# Patient Record
Sex: FEMALE | Race: BLACK OR AFRICAN AMERICAN | Employment: OTHER | URBAN - METROPOLITAN AREA
[De-identification: names, ages, dates, MRNs, and addresses within clinical notes are randomized per-mention and may not be internally consistent; named-entity substitution may affect disease eponyms.]

---

## 2018-04-29 ENCOUNTER — ED HISTORICAL/CONVERTED ENCOUNTER (OUTPATIENT)
Dept: OTHER | Age: 68
End: 2018-04-29

## 2020-10-13 ENCOUNTER — HOSPITAL ENCOUNTER (EMERGENCY)
Age: 70
Discharge: HOME | End: 2020-10-13
Attending: EMERGENCY MEDICINE
Payer: MEDICARE

## 2020-10-13 ENCOUNTER — APPOINTMENT (OUTPATIENT)
Dept: GENERAL RADIOLOGY | Age: 70
End: 2020-10-13
Attending: EMERGENCY MEDICINE
Payer: MEDICARE

## 2020-10-13 VITALS
HEIGHT: 61 IN | HEART RATE: 71 BPM | WEIGHT: 149 LBS | TEMPERATURE: 98.5 F | DIASTOLIC BLOOD PRESSURE: 77 MMHG | OXYGEN SATURATION: 97 % | BODY MASS INDEX: 28.13 KG/M2 | SYSTOLIC BLOOD PRESSURE: 210 MMHG | RESPIRATION RATE: 18 BRPM

## 2020-10-13 DIAGNOSIS — J20.9 ACUTE BRONCHITIS, UNSPECIFIED ORGANISM: ICD-10-CM

## 2020-10-13 DIAGNOSIS — E87.70 HYPERVOLEMIA, UNSPECIFIED HYPERVOLEMIA TYPE: ICD-10-CM

## 2020-10-13 DIAGNOSIS — I16.0 HYPERTENSIVE URGENCY: Primary | ICD-10-CM

## 2020-10-13 DIAGNOSIS — N18.6 ESRD ON DIALYSIS (HCC): ICD-10-CM

## 2020-10-13 DIAGNOSIS — Z99.2 ESRD ON DIALYSIS (HCC): ICD-10-CM

## 2020-10-13 PROCEDURE — 99282 EMERGENCY DEPT VISIT SF MDM: CPT

## 2020-10-13 PROCEDURE — 93005 ELECTROCARDIOGRAM TRACING: CPT

## 2020-10-13 RX ORDER — CLONIDINE HYDROCHLORIDE 0.1 MG/1
0.1 TABLET ORAL
Status: DISCONTINUED | OUTPATIENT
Start: 2020-10-13 | End: 2020-10-13 | Stop reason: HOSPADM

## 2020-10-13 RX ORDER — AZITHROMYCIN 250 MG/1
TABLET, FILM COATED ORAL
Qty: 6 TAB | Refills: 0 | Status: SHIPPED | OUTPATIENT
Start: 2020-10-13 | End: 2022-02-04 | Stop reason: SDUPTHER

## 2020-10-13 RX ORDER — ALBUTEROL SULFATE 90 UG/1
2 AEROSOL, METERED RESPIRATORY (INHALATION)
Qty: 1 INHALER | Refills: 0 | Status: SHIPPED | OUTPATIENT
Start: 2020-10-13

## 2020-10-13 RX ORDER — ASPIRIN 325 MG
325 TABLET ORAL
Status: DISCONTINUED | OUTPATIENT
Start: 2020-10-13 | End: 2020-10-13 | Stop reason: HOSPADM

## 2020-10-13 RX ORDER — DEXTROMETHORPHAN HYDROBROMIDE, GUAIFENESIN 20; 400 MG/20ML; MG/20ML
5 SOLUTION ORAL EVERY 6 HOURS
Qty: 200 ML | Refills: 0 | Status: SHIPPED | OUTPATIENT
Start: 2020-10-13

## 2020-10-13 NOTE — ED PROVIDER NOTES
EMERGENCY DEPARTMENT HISTORY AND PHYSICAL EXAM      Date: (Not on file)  Patient Name: Stacey Richardson    History of Presenting Illness     Chief Complaint   Patient presents with    Chest Pain    Cough       History Provided By: Patient    HPI: Stacey Richardson, 72 y.o. female with a past medical history significant renal insufficiency presents to the ED with chief complaint of Chest Pain and Cough  . History of renal insufficiency on dialysis. Patient missed dialysis yesterday. Over the course of the last week she is had a lot of cough congestion. No shortness of breath. Chest pain only when she coughs. No wheezing. No nausea or vomiting. No fevers. There are no other complaints, changes, or physical findings at this time. PCP: No primary care provider on file. Current Facility-Administered Medications   Medication Dose Route Frequency Provider Last Rate Last Dose    cloNIDine HCL (CATAPRES) tablet 0.1 mg  0.1 mg Oral NOW Kayy Carpio MD        aspirin tablet 325 mg  325 mg Oral NOW Kayy Carpio MD           Past History     Past Medical History:  No past medical history on file. Past Surgical History:  No past surgical history on file. Family History:  No family history on file. Social History:  Social History     Tobacco Use    Smoking status: Not on file   Substance Use Topics    Alcohol use: Not on file    Drug use: Not on file       Allergies: Allergies   Allergen Reactions    Percocet [Oxycodone-Acetaminophen] Itching         Review of Systems   Review of Systems   Constitutional: Positive for chills and fatigue. Negative for diaphoresis. HENT: Positive for congestion and sore throat. Negative for ear pain and nosebleeds. Eyes: Negative. Negative for pain, discharge and redness. Respiratory: Positive for cough and shortness of breath. Negative for chest tightness and wheezing. Cardiovascular: Negative.   Negative for chest pain, palpitations and leg swelling. Gastrointestinal: Negative. Negative for abdominal pain, constipation, diarrhea, nausea and vomiting. Endocrine: Negative. Negative for cold intolerance. Genitourinary: Negative. Negative for difficulty urinating, dysuria and hematuria. Musculoskeletal: Negative. Negative for arthralgias, joint swelling and neck pain. Skin: Negative. Negative for color change, pallor, rash and wound. Allergic/Immunologic: Negative. Neurological: Negative. Negative for dizziness, syncope, weakness, light-headedness and headaches. Hematological: Negative. Does not bruise/bleed easily. Psychiatric/Behavioral: Negative. Negative for behavioral problems, confusion and suicidal ideas. All other systems reviewed and are negative. Physical Exam   Physical Exam  Vitals signs and nursing note reviewed. Exam conducted with a chaperone present. Constitutional:       Appearance: Normal appearance. She is normal weight. HENT:      Head: Normocephalic and atraumatic. Nose: Nose normal.      Mouth/Throat:      Mouth: Mucous membranes are moist.      Pharynx: Oropharynx is clear. Eyes:      Extraocular Movements: Extraocular movements intact. Conjunctiva/sclera: Conjunctivae normal.      Pupils: Pupils are equal, round, and reactive to light. Neck:      Musculoskeletal: Normal range of motion and neck supple. Cardiovascular:      Rate and Rhythm: Normal rate and regular rhythm. Pulses: Normal pulses. Heart sounds: Normal heart sounds. Pulmonary:      Effort: Pulmonary effort is normal. No respiratory distress. Breath sounds: Wheezing present. Comments: Slight cough  Abdominal:      General: Abdomen is flat. Bowel sounds are normal. There is no distension. Palpations: Abdomen is soft. Tenderness: There is no abdominal tenderness. There is no guarding. Musculoskeletal: Normal range of motion.          General: No swelling, tenderness, deformity or signs of injury. Right lower leg: No edema. Left lower leg: No edema. Skin:     General: Skin is warm and dry. Capillary Refill: Capillary refill takes less than 2 seconds. Findings: No lesion or rash. Neurological:      General: No focal deficit present. Mental Status: She is alert and oriented to person, place, and time. Mental status is at baseline. Cranial Nerves: No cranial nerve deficit. Psychiatric:         Mood and Affect: Mood normal.         Behavior: Behavior normal.         Thought Content: Thought content normal.         Judgment: Judgment normal.         Diagnostic Study Results     Labs -   No results found for this or any previous visit (from the past 12 hour(s)). Radiologic Studies -   XR CHEST SNGL V    (Results Pending)     CT Results  (Last 48 hours)    None        CXR Results  (Last 48 hours)    None          Medical Decision Making and ED Course   I am the first provider for this patient. I reviewed the vital signs, available nursing notes, past medical history, past surgical history, family history and social history. Vital Signs-Reviewed the patient's vital signs. Patient Vitals for the past 12 hrs:   Temp Pulse Resp BP SpO2   10/13/20 1251 98.5 °F (36.9 °C) 71 18 (!) 210/77 97 %       EKG interpretation:   1255p  EKG interpretation:  Normal sinus rhythm. Rate 69. No ST segment changes. No T wave Inversions. Normal Intervals. Interpreted by ED physician. Reason rule out dysarrythmia        Records Reviewed: Previous Hospital chart. EMS run report      ED Course:   Initial assessment performed. The patients presenting problems have been discussed, and they are in agreement with the care plan formulated and outlined with them. I have encouraged them to ask questions as they arise throughout their visit.     Orders Placed This Encounter    XR CHEST SNGL V     Standing Status:   Standing     Number of Occurrences:   1     Order Specific Question:   Transport Answer:   Mansoor Roberts [5]     Order Specific Question:   Reason for Exam     Answer:   sob    CBC WITH AUTOMATED DIFF     Standing Status:   Standing     Number of Occurrences:   1    METABOLIC PANEL, COMPREHENSIVE     Standing Status:   Standing     Number of Occurrences:   1    TROPONIN I     Standing Status:   Standing     Number of Occurrences:   1    BNP     Standing Status:   Standing     Number of Occurrences:   1    URINALYSIS W/ REFLEX CULTURE     Standing Status:   Standing     Number of Occurrences:   1    MAGNESIUM     Standing Status:   Standing     Number of Occurrences:   1    IP CONSULT TO NEPHROLOGY     Standing Status:   Standing     Number of Occurrences:   1     Order Specific Question:   Reason for Consult: Answer:   esrd     Order Specific Question:   Did you call or speak to the consulting provider? Answer: Yes    EKG, 12 LEAD, INITIAL     Standing Status:   Standing     Number of Occurrences:   1     Order Specific Question:   Reason for Exam:     Answer:   Pain    EKG 12 LEAD INITIAL     Standing Status:   Standing     Number of Occurrences:   1     Order Specific Question:   Reason for Exam:     Answer:   sob    cloNIDine HCL (CATAPRES) tablet 0.1 mg    aspirin tablet 325 mg              CONSULTANTS:  12:59 PM  Dr Lacey Andersen aware pt in ED and missed HD yest    1:40 PM  going to [address] [facility] felt no HD needed, BP controlled at HD and sent back for dc and control with abx and steriod for bronchitis    Provider Notes (Medical Decision Making): Sob DD fluid overload, pna, acs. Bronchitis. Per nephrology no HD needed, dc home, HD tomorrow      Procedures                       Disposition       Emergency Department Disposition:  dc        DISCHARGE PLAN:    Patient is discharged home. Discharge instructions provided. Patient is stable and improved at time of disposition. Vitals are stable.     1. Cannot display discharge medications since this patient is not currently admitted. 2.   Follow-up Information     Follow up With Specialties Details Why Contact Info    Janet Rangel MD Nephrology Schedule an appointment as soon as possible for a visit  [address] [place] [address]  [phone]          3. Return to ED if worse     Pt voiced they understand they plan and do not have questions at this time  '      Diagnosis     Clinical Impression: No diagnosis found. bronchitis, EDRS onhD. HTN    Attestations:    Memo Hoover MD    Please note that this dictation was completed with [employer] the computer voice recognition software. Quite often unanticipated grammatical, syntax, homophones, and other interpretive errors are inadvertently transcribed by the computer software. Please disregard these errors. Please excuse any errors that have escaped final proofreading. Thank you.

## 2020-10-13 NOTE — DISCHARGE INSTRUCTIONS
Bronchitis: Care Instructions  Your Care Instructions     Bronchitis is inflammation of the bronchial tubes, which carry air to the lungs. The tubes swell and produce mucus, or phlegm. The mucus and inflamed bronchial tubes make you cough. You may have trouble breathing. Most cases of bronchitis are caused by viruses like those that cause colds. Antibiotics usually do not help and they may be harmful. Bronchitis usually develops rapidly and lasts about 2 to 3 weeks in otherwise healthy people. Follow-up care is a key part of your treatment and safety. Be sure to make and go to all appointments, and call your doctor if you are having problems. It's also a good idea to know your test results and keep a list of the medicines you take. How can you care for yourself at home? · Take all medicines exactly as prescribed. Call your doctor if you think you are having a problem with your medicine. · Get some extra rest.  · Take an over-the-counter pain medicine, such as acetaminophen (Tylenol), ibuprofen (Advil, Motrin), or naproxen (Aleve) to reduce fever and relieve body aches. Read and follow all instructions on the label. · Do not take two or more pain medicines at the same time unless the doctor told you to. Many pain medicines have acetaminophen, which is Tylenol. Too much acetaminophen (Tylenol) can be harmful. · Take an over-the-counter cough medicine that contains dextromethorphan to help quiet a dry, hacking cough so that you can sleep. Avoid cough medicines that have more than one active ingredient. Read and follow all instructions on the label. · Breathe moist air from a humidifier, hot shower, or sink filled with hot water. The heat and moisture will thin mucus so you can cough it out. · Do not smoke. Smoking can make bronchitis worse. If you need help quitting, talk to your doctor about stop-smoking programs and medicines. These can increase your chances of quitting for good.   When should you call for help? Call 911 anytime you think you may need emergency care. For example, call if:    · You have severe trouble breathing. Call your doctor now or seek immediate medical care if:    · You have new or worse trouble breathing.     · You cough up dark brown or bloody mucus (sputum).     · You have a new or higher fever.     · You have a new rash. Watch closely for changes in your health, and be sure to contact your doctor if:    · You cough more deeply or more often, especially if you notice more mucus or a change in the color of your mucus.     · You are not getting better as expected. Where can you learn more? Go to http://www.gray.com/  Enter H333 in the search box to learn more about \"Bronchitis: Care Instructions. \"  Current as of: February 24, 2020               Content Version: 12.6  © 1669-8042 [employer] Care instructions adapted under license by [employer] (which disclaims liability or warranty for this information). If you have questions about a medical condition or this instruction, always ask your healthcare professional. [place] [address] any warranty or liability for your use of this information. Patient Education        Kidney Dialysis: Care Instructions  Your Care Instructions     Dialysis is a process that filters wastes from the blood when your kidneys can no longer do the job. It is not a cure, but it can help you live longer and feel better. It is a lifesaving treatment when you have kidney failure. Normal kidneys work 24 hours a day to clean wastes from your blood. Your kidneys are not able to do this job, so a process called dialysis will do some of the work for your kidneys. You and your doctor will decide which type of dialysis you should have. Peritoneal dialysis uses the lining of your belly (peritoneum) to filter your blood. You can do it at home, on a daily basis.  Hemodialysis uses a man-made filter called a dialyzer to clean your blood. Most people need to go to a hospital or clinic 3 days a week for several hours each time. Sometimes hemodialysis can be done at home. It is normal to have questions about your treatment, and you have a right to know what is happening to you. Learning about dialysis can help you take an active role in your treatment. Dialysis does not cure kidney disease, but it can help you live longer and feel better. You will need to follow your diet and treatment schedule carefully. Follow-up care is a key part of your treatment and safety. Be sure to make and go to all appointments, and call your doctor if you are having problems. It's also a good idea to know your test results and keep a list of the medicines you take. What do you need to know about peritoneal dialysis? Peritoneal dialysis uses the lining of your belly (or peritoneal membrane) to filter your blood. Before you can begin peritoneal dialysis, your doctor will need to place a thin tube called a catheter in your belly. This is the dialysis access. · Peritoneal dialysis can be done at home or in any clean place. You may be able to do it while you sleep. · You can do it by yourself. You don't have to rely on help from others. · You can do it at the times you choose as long as you do the right number of treatments. · It has to be done every day of the week. · Some people find it hard to do all the required steps. · It increases your chance for a serious infection of the lining of the belly (peritoneum). Overview  Hemodialysis uses a man-made membrane (dialyzer) to clean your blood. You're connected to the dialyzer by tubes attached to your blood vessels. Before you start dialysis, your doctor will create a site where the blood can flow in and out of your body during your sessions. · Hemodialysis is done mainly by trained health workers. They can watch for problems.   · You can do it at a center where other people are doing dialysis. This can help provide emotional support. · You can schedule your treatments in the evenings and maybe at home. This gives you more control over your schedule. · It usually needs to be done on a set schedule 3 times a week. · It can cause side effects, like low blood pressure and muscle cramps. These can often be treated easily. · It requires being poked by a needle at each treatment. This bothers some people. Others get used to it and can do it themselves. How can you care for yourself at home? · Be sure to have all of your dialysis sessions. Do not try to shorten or skip your sessions. You have a better chance of a longer and healthier life by getting your full treatment. · Your doctor or health care team will show you the steps you need to go through each day before, during, and after dialysis. Be sure to follow these steps. If you do not understand a step, talk to your team.  · Your doctor and dietitian will help you design menus that follow your diet. Be sure to follow your diet guidelines. ? You will need to limit fluids and certain foods that contain salt (sodium), potassium, and phosphorus. ? You may need to follow a heart-healthy diet to keep the fat (cholesterol) in your blood under control. ? You may need higher levels of protein in your diet. · Your doctor may recommend certain vitamins. But do not take any other medicine, including over-the-counter medicines, vitamins, and herbal products, without talking to your doctor first.  · Do not smoke. Smoking raises your risk of many health problems, including more kidney damage. If you need help quitting, talk to your doctor about stop-smoking programs and medicines. These can increase your chances of quitting for good. · Do not take ibuprofen (Advil, Motrin), naproxen (Aleve), or similar medicines, unless your doctor tells you to. These medicines may make kidney problems worse. When should you call for help?    Call your doctor now or seek immediate medical care if:    · You have a fever.     · You are dizzy or lightheaded, or you feel like you may faint.     · You are confused or cannot think clearly.     · You have new or worse nausea or vomiting.     · You have new or more blood in your urine.     · You have new swelling. Watch closely for changes in your health, and be sure to contact your doctor if:    · You do not get better as expected. Where can you learn more? Go to http://www.gray.com/  Enter U505 in the search box to learn more about \"Kidney Dialysis: Care Instructions. \"  Current as of: April 15, 2020               Content Version: 12.6  © 9875-6978 [employer]. Care instructions adapted under license by [employer] (which disclaims liability or warranty for this information). If you have questions about a medical condition or this instruction, always ask your healthcare professional. [address] any warranty or liability for your use of this information.

## 2020-10-13 NOTE — PROGRESS NOTES
Renal    Patient well-known to my service. Came to emergency room because of chest tightness probably related to tracheobronchitis. She missed dialysis yesterday because of malaise. She looks fairly well today. Pressure was a bit high but she did not take her medicines this morning. She has not eaten. There was a concern that she may need dialysis. I saw her in the dialysis suite. She was comfortable, not tachypneic, conversant in no acute distress. Blood pressure 194/86. Physical examination:  HEENT: Normocephalic atraumatic  Neck: No accessory muscle use, no JVD  Chest: Clear without wheezing but some restricted expansion. Cardiac: Regular rhythm no S3 gallop no rub  Abdomen: Soft, no CVAT, no sacral edema  Extremities: No lower extremity edema  Neurology: Alert, oriented x3, conversant    Discussed with ER physician, Dr. Caitlyn Ochoa. No acute indication for dialysis. I think her blood pressure elevated from missed  Medications. Give her a Medrol Dosepak and albuterol inhaler. She will be seen tomorrow in dialysis. She is agreeable to this.     Emmy Rios MD

## 2020-10-14 LAB
ATRIAL RATE: 69 BPM
CALCULATED P AXIS, ECG09: 55 DEGREES
CALCULATED R AXIS, ECG10: -34 DEGREES
CALCULATED T AXIS, ECG11: 36 DEGREES
DIAGNOSIS, 93000: NORMAL
P-R INTERVAL, ECG05: 150 MS
Q-T INTERVAL, ECG07: 416 MS
QRS DURATION, ECG06: 78 MS
QTC CALCULATION (BEZET), ECG08: 445 MS
VENTRICULAR RATE, ECG03: 69 BPM

## 2021-03-26 ENCOUNTER — HOSPITAL ENCOUNTER (EMERGENCY)
Age: 71
Discharge: HOME | End: 2021-03-26
Attending: FAMILY MEDICINE
Payer: MEDICARE

## 2021-03-26 ENCOUNTER — APPOINTMENT (OUTPATIENT)
Dept: GENERAL RADIOLOGY | Age: 71
End: 2021-03-26
Attending: FAMILY MEDICINE
Payer: MEDICARE

## 2021-03-26 VITALS
HEIGHT: 61 IN | HEART RATE: 74 BPM | WEIGHT: 149 LBS | SYSTOLIC BLOOD PRESSURE: 168 MMHG | TEMPERATURE: 98.1 F | RESPIRATION RATE: 16 BRPM | BODY MASS INDEX: 28.13 KG/M2 | OXYGEN SATURATION: 95 % | DIASTOLIC BLOOD PRESSURE: 73 MMHG

## 2021-03-26 DIAGNOSIS — S62.647A CLOSED NONDISPLACED FRACTURE OF PROXIMAL PHALANX OF LEFT LITTLE FINGER, INITIAL ENCOUNTER: Primary | ICD-10-CM

## 2021-03-26 DIAGNOSIS — S62.345A NONDISPLACED FRACTURE OF BASE OF FOURTH METACARPAL BONE, LEFT HAND, INITIAL ENCOUNTER FOR CLOSED FRACTURE: ICD-10-CM

## 2021-03-26 PROCEDURE — 99282 EMERGENCY DEPT VISIT SF MDM: CPT

## 2021-03-26 PROCEDURE — 75810000301 HC ER LEVEL 1 CLOSED TREATMNT FRACTURE/DISLOCATION

## 2021-03-26 PROCEDURE — 74011000250 HC RX REV CODE- 250: Performed by: FAMILY MEDICINE

## 2021-03-26 PROCEDURE — 73140 X-RAY EXAM OF FINGER(S): CPT

## 2021-03-26 PROCEDURE — 73130 X-RAY EXAM OF HAND: CPT

## 2021-03-26 RX ORDER — LIDOCAINE HYDROCHLORIDE 10 MG/ML
5 INJECTION INFILTRATION; PERINEURAL ONCE
Status: COMPLETED | OUTPATIENT
Start: 2021-03-26 | End: 2021-03-26

## 2021-03-26 RX ADMIN — LIDOCAINE HYDROCHLORIDE 5 ML: 10 INJECTION, SOLUTION INFILTRATION; PERINEURAL at 17:20

## 2021-03-26 NOTE — DISCHARGE INSTRUCTIONS
Thank you! Thank you for allowing me to care for you in the emergency department. I sincerely hope that you are satisfied with your visit today. It is my goal to provide you with excellent care. Below you will find a list of your labs and imaging from your visit today. Should you have any questions regarding these results please do not hesitate to call the emergency department. Labs -   No results found for this or any previous visit (from the past 12 hour(s)). Radiologic Studies -   XR HAND LT MIN 3 V   Final Result      XR 5TH FINGER LT MIN 2 V    (Results Pending)     CT Results  (Last 48 hours)      None          CXR Results  (Last 48 hours)      None               If you feel that you have not received excellent quality care or timely care, please ask to speak to the nurse manager. Please choose us in the future for your continued health care needs. ------------------------------------------------------------------------------------------------------------  The exam and treatment you received in the Emergency Department were for an urgent problem and are not intended as complete care. It is important that you follow-up with a doctor, nurse practitioner, or physician assistant to:  (1) confirm your diagnosis,  (2) re-evaluation of changes in your illness and treatment, and  (3) for ongoing care. If your symptoms become worse or you do not improve as expected and you are unable to reach your usual health care provider, you should return to the Emergency Department. We are available 24 hours a day. Please take your discharge instructions with you when you go to your follow-up appointment. If you have any problem arranging a follow-up appointment, contact the Emergency Department immediately. If a prescription has been provided, please have it filled as soon as possible to prevent a delay in treatment. Read the entire medication instruction sheet provided to you by the pharmacy.  If you have any questions or reservations about taking the medication due to side effects or interactions with other medications, please call your primary care physician or contact the ER to speak with the charge nurse. Make an appointment with your family doctor or the physician you were referred to for follow-up of this visit as instructed on your discharge paperwork, as this is a mandatory follow-up. Return to the ER if you are unable to be seen or if you are unable to be seen in a timely manner. If you have any problem arranging the follow-up visit, contact the Emergency Department immediately.

## 2021-03-26 NOTE — ED TRIAGE NOTES
Pt presents after a mechanical fall today about 3 hours ago. States the 5th digit on the left hand bend outward. Now with pain and significant edema and ecchymosis of left hand and and fingers. Was given Tylenol at dialysis 2 hours ago with improvement of pain.

## 2021-03-26 NOTE — ED PROVIDER NOTES
EMERGENCY DEPARTMENT HISTORY AND PHYSICAL EXAM      Date: 3/26/2021  Patient Name: Liz Mcgovern    History of Presenting Illness     Chief Complaint   Patient presents with    Hand Injury       History Provided By:     HPI: Liz Mcgovern, is an extremely pleasant 72 y.o. female presenting to the ED with a chief complaint of left hand/finger pain. Patient states prior to arrival she slipped on the sidewalk and fell onto her left hand. She states she had immediate pain. She has difficulty making a fist with this hand secondary to pain in her fourth and fifth fingers. She denies any numbness or tingling of this hand or fingers. She denies any other injuries. She did not hit her head. She did not lose consciousness. She denies other concerns. There are no other complaints, changes, or physical findings at this time. PCP: Renee King MD    No current facility-administered medications on file prior to encounter. Current Outpatient Medications on File Prior to Encounter   Medication Sig Dispense Refill    azithromycin (Zithromax Z-John) 250 mg tablet Take 2 tablet p.o. day 1 then 1 tablet p.o. day 2 through 5 6 Tab 0    albuterol (PROVENTIL HFA, VENTOLIN HFA, PROAIR HFA) 90 mcg/actuation inhaler Take 2 Puffs by inhalation every four (4) hours as needed for Wheezing. 1 Inhaler 0    dextromethorphan-guaiFENesin (Robitussin Cough-Chest Prosper DM) 5-100 mg/5 mL liqd Take 5 mL by mouth every six (6) hours. 200 mL 0       Past History     Past Medical History:  Past Medical History:   Diagnosis Date    ESRD on hemodialysis ([facility] [address]        Past Surgical History:  No past surgical history on file. Family History:  No family history on file. Social History:  Social History     Tobacco Use    Smoking status: Former Smoker   Substance Use Topics    Alcohol use: Never     Frequency: Never    Drug use: Never       Allergies:   Allergies   Allergen Reactions    Percocet [Oxycodone-Acetaminophen] Itching         Review of Systems     Review of Systems   Constitutional: Negative for activity change, appetite change, chills, fatigue and fever. HENT: Negative for congestion and sore throat. Eyes: Negative for photophobia and visual disturbance. Respiratory: Negative for cough, shortness of breath and wheezing. Cardiovascular: Negative for chest pain, palpitations and leg swelling. Gastrointestinal: Negative for abdominal pain, diarrhea, nausea and vomiting. Endocrine: Negative for cold intolerance and heat intolerance. Musculoskeletal: Negative for gait problem and joint swelling. Left hand pain   Skin: Negative for color change and rash. Neurological: Negative for dizziness, numbness and headaches. Physical Exam     Physical Exam  Constitutional:       Appearance: She is well-developed. HENT:      Head: Normocephalic and atraumatic. Mouth/Throat:      Mouth: Mucous membranes are moist.      Pharynx: Oropharynx is clear. Eyes:      Conjunctiva/sclera: Conjunctivae normal.      Pupils: Pupils are equal, round, and reactive to light. Neck:      Musculoskeletal: Normal range of motion and neck supple. Cardiovascular:      Rate and Rhythm: Normal rate and regular rhythm. Heart sounds: No murmur. Pulmonary:      Effort: No respiratory distress. Breath sounds: No stridor. No wheezing, rhonchi or rales. Abdominal:      General: There is no distension. Tenderness: There is no abdominal tenderness. There is no rebound. Musculoskeletal:      Comments: Gross deformity of left fifth digit. Exquisite tenderness to the base of the left fifth and fourth digits. Sensation intact in all fingers. Skin:     General: Skin is warm and dry. Capillary Refill: Fingers well perfused, capillary refill less than 2 sec     Comments: Echymosis of left 5 and 4th digits    Neurological:      General: No focal deficit present.       Mental Status: She is alert and oriented to person, place, and time. Comments: Neurovascularly intact left hand. Psychiatric:         Mood and Affect: Mood normal.         Behavior: Behavior normal.         Lab and Diagnostic Study Results     Labs -   No results found for this or any previous visit (from the past 12 hour(s)). Radiologic Studies -   @lastxrresult@  CT Results  (Last 48 hours)    None        CXR Results  (Last 48 hours)    None            Medical Decision Making   - I am the first provider for this patient. - I reviewed the vital signs, available nursing notes, past medical history, past surgical history, family history and social history. - Initial assessment performed. The patients presenting problems have been discussed, and they are in agreement with the care plan formulated and outlined with them. I have encouraged them to ask questions as they arise throughout their visit. Vital Signs-Reviewed the patient's vital signs. Patient Vitals for the past 12 hrs:   Temp Pulse Resp BP SpO2   03/26/21 1506 98.1 °F (36.7 °C) 74 16 (!) 168/73 95 %         ED Course/ Provider Notes (Medical Decision Making):     Patient presented to the emergency department with a chief complaint of left hand pain. X-rays demonstrate closed fractures of the fourth and fifth proximal phalanx. 30% angulation of the fifth digit fracture, no displacement. Case was discussed in detail with orthopedic surgery on-call who agrees nerve block and reduction is reasonable before placing an ulnar gutter. Repeat imaging shows improved alignment of fracture. Repeat neurovascular exam of the left upper extremity remains normal after splinting. Patient will follow up with orthopedic surgery. Adelia Keith was given a thorough list of signs and symptoms that would warrant an immediate return to the emergency department. Otherwise Adelia Cumminstasneem will follow up with PCP.        Procedures   Medical Decision Makingedical Decision Making  Performed by: Carlos Alberto Prakash DO  Reduction of left fifth phalanx fracture    Date/Time: 3/27/2021 8:57 AM  Performed by: Nahed Umanzor DO  Authorized by: Nahed Umanzor DO     Consent:     Consent obtained:  Verbal    Consent given by:  Patient    Risks discussed:  Pain and nerve damage    Alternatives discussed:  No treatment, delayed treatment, observation and referral  Indications:     Indications:  Angulated phalanx fracture  Anesthesia (see MAR for exact dosages): Anesthesia method:  Nerve block    Block anesthetic:  Lidocaine 1% w/o epi    Block injection procedure:  Anatomic landmarks identified, introduced needle, incremental injection, anatomic landmarks palpated and negative aspiration for blood    Block outcome:  Anesthesia achieved  Post-procedure details:     Patient tolerance of procedure: Tolerated well, no immediate complications  Comments:      Improvement of alignment of fracture on postreduction x-ray. Ulnar gutter splint placed. Repeat neurovascular exam is normal.      None       Disposition   Disposition:     Home     All of the diagnostic tests were reviewed and questions answered. Diagnosis, care plan and treatment options were discussed. The patient understands the instructions and will follow up as directed. The patients results have been reviewed with them. They have been counseled regarding their diagnosis. The patient verbally convey understanding and agreement of the signs, symptoms, diagnosis, treatment and prognosis and additionally agrees to follow up as recommended with their PCP in 24 - 48 hours. They also agree with the care-plan and convey that all of their questions have been answered.   I have also put together some discharge instructions for them that include: 1) educational information regarding their diagnosis, 2) how to care for their diagnosis at home, as well a 3) list of reasons why they would want to return to the ED prior to their follow-up appointment, should their condition change. DISCHARGE PLAN:    1. Cannot display discharge medications since this patient is not currently admitted. 2.   Follow-up Information     Follow up With Specialties Details Why Ismael Hallman MD Orthopedic Surgery Schedule an appointment as soon as possible for a visit   [address]  [address] [facility] [address] [phone]      Shauna Roman MD Nephrology Schedule an appointment as soon as possible for a visit   [address] [phone]              3.  Return to ED if worse       4. Discharge Medication List as of 3/26/2021  5:21 PM            Diagnosis     Clinical Impression:    1. Closed nondisplaced fracture of proximal phalanx of left little finger, initial encounter    2. Nondisplaced fracture of base of fourth metacarpal bone, left hand, initial encounter for closed fracture        Attestations:    Pawel Adams, DO    Please note that this dictation was completed with [employer], the computer voice recognition software. Quite often unanticipated grammatical, syntax, homophones, and other interpretive errors are inadvertently transcribed by the computer software. Please disregard these errors. Please excuse any errors that have escaped final proofreading. Thank you.

## 2022-02-04 ENCOUNTER — HOSPITAL ENCOUNTER (EMERGENCY)
Age: 72
Discharge: HOME | End: 2022-02-04
Admitting: EMERGENCY MEDICINE
Payer: MEDICARE

## 2022-02-04 ENCOUNTER — APPOINTMENT (OUTPATIENT)
Dept: GENERAL RADIOLOGY | Age: 72
End: 2022-02-04
Attending: EMERGENCY MEDICINE
Payer: MEDICARE

## 2022-02-04 VITALS
HEART RATE: 78 BPM | BODY MASS INDEX: 28.32 KG/M2 | DIASTOLIC BLOOD PRESSURE: 85 MMHG | SYSTOLIC BLOOD PRESSURE: 177 MMHG | HEIGHT: 61 IN | WEIGHT: 150 LBS | TEMPERATURE: 98.4 F | RESPIRATION RATE: 18 BRPM | OXYGEN SATURATION: 5 %

## 2022-02-04 DIAGNOSIS — J06.9 ACUTE URI: Primary | ICD-10-CM

## 2022-02-04 DIAGNOSIS — J42 CHRONIC BRONCHITIS, UNSPECIFIED CHRONIC BRONCHITIS TYPE (HCC): ICD-10-CM

## 2022-02-04 DIAGNOSIS — N18.6 ESRD (END STAGE RENAL DISEASE) (HCC): ICD-10-CM

## 2022-02-04 LAB
ALBUMIN SERPL-MCNC: 3.7 G/DL (ref 3.5–5)
ALBUMIN/GLOB SERPL: 1.1 {RATIO} (ref 1.1–2.2)
ALP SERPL-CCNC: 63 U/L (ref 45–117)
ALT SERPL-CCNC: 11 U/L (ref 12–78)
ANION GAP SERPL CALC-SCNC: 7 MMOL/L (ref 5–15)
AST SERPL W P-5'-P-CCNC: 15 U/L (ref 15–37)
ATRIAL RATE: 71 BPM
BASOPHILS # BLD: 0 K/UL (ref 0–0.1)
BASOPHILS NFR BLD: 1 % (ref 0–1)
BILIRUB SERPL-MCNC: 0.9 MG/DL (ref 0.2–1)
BNP SERPL-MCNC: ABNORMAL PG/ML
BUN SERPL-MCNC: 31 MG/DL (ref 6–20)
BUN/CREAT SERPL: 4 (ref 12–20)
CA-I BLD-MCNC: 9.4 MG/DL (ref 8.5–10.1)
CALCULATED P AXIS, ECG09: 60 DEGREES
CALCULATED R AXIS, ECG10: -35 DEGREES
CALCULATED T AXIS, ECG11: -74 DEGREES
CHLORIDE SERPL-SCNC: 95 MMOL/L (ref 97–108)
CO2 SERPL-SCNC: 35 MMOL/L (ref 21–32)
CREAT SERPL-MCNC: 7.55 MG/DL (ref 0.55–1.02)
DIAGNOSIS, 93000: NORMAL
DIFFERENTIAL METHOD BLD: ABNORMAL
EOSINOPHIL # BLD: 0.2 K/UL (ref 0–0.4)
EOSINOPHIL NFR BLD: 4 % (ref 0–7)
ERYTHROCYTE [DISTWIDTH] IN BLOOD BY AUTOMATED COUNT: 14.6 % (ref 11.5–14.5)
GLOBULIN SER CALC-MCNC: 3.4 G/DL (ref 2–4)
GLUCOSE SERPL-MCNC: 68 MG/DL (ref 65–100)
HCT VFR BLD AUTO: 30.5 % (ref 35–47)
HGB BLD-MCNC: 9.7 G/DL (ref 11.5–16)
IMM GRANULOCYTES # BLD AUTO: 0 K/UL (ref 0–0.04)
IMM GRANULOCYTES NFR BLD AUTO: 0 % (ref 0–0.5)
LYMPHOCYTES # BLD: 1.1 K/UL (ref 0.8–3.5)
LYMPHOCYTES NFR BLD: 23 % (ref 12–49)
MCH RBC QN AUTO: 31.1 PG (ref 26–34)
MCHC RBC AUTO-ENTMCNC: 31.8 G/DL (ref 30–36.5)
MCV RBC AUTO: 97.8 FL (ref 80–99)
MONOCYTES # BLD: 0.4 K/UL (ref 0–1)
MONOCYTES NFR BLD: 8 % (ref 5–13)
NEUTS SEG # BLD: 3 K/UL (ref 1.8–8)
NEUTS SEG NFR BLD: 64 % (ref 32–75)
NRBC # BLD: 0 K/UL (ref 0–0.01)
NRBC BLD-RTO: 0 PER 100 WBC
P-R INTERVAL, ECG05: 148 MS
PLATELET # BLD AUTO: 105 K/UL (ref 150–400)
PMV BLD AUTO: 11.7 FL (ref 8.9–12.9)
POTASSIUM SERPL-SCNC: 4.1 MMOL/L (ref 3.5–5.1)
PROT SERPL-MCNC: 7.1 G/DL (ref 6.4–8.2)
Q-T INTERVAL, ECG07: 462 MS
QRS DURATION, ECG06: 78 MS
QTC CALCULATION (BEZET), ECG08: 502 MS
RBC # BLD AUTO: 3.12 M/UL (ref 3.8–5.2)
SARS-COV-2, COV2: NORMAL
SODIUM SERPL-SCNC: 137 MMOL/L (ref 136–145)
TROPONIN-HIGH SENSITIVITY: 31 NG/L (ref 0–51)
VENTRICULAR RATE, ECG03: 71 BPM
WBC # BLD AUTO: 4.6 K/UL (ref 3.6–11)

## 2022-02-04 PROCEDURE — 71045 X-RAY EXAM CHEST 1 VIEW: CPT

## 2022-02-04 PROCEDURE — U0005 INFEC AGEN DETEC AMPLI PROBE: HCPCS

## 2022-02-04 PROCEDURE — 93005 ELECTROCARDIOGRAM TRACING: CPT

## 2022-02-04 PROCEDURE — 99283 EMERGENCY DEPT VISIT LOW MDM: CPT

## 2022-02-04 PROCEDURE — 83880 ASSAY OF NATRIURETIC PEPTIDE: CPT

## 2022-02-04 PROCEDURE — 36415 COLL VENOUS BLD VENIPUNCTURE: CPT

## 2022-02-04 PROCEDURE — 85025 COMPLETE CBC W/AUTO DIFF WBC: CPT

## 2022-02-04 PROCEDURE — 80053 COMPREHEN METABOLIC PANEL: CPT

## 2022-02-04 PROCEDURE — 84484 ASSAY OF TROPONIN QUANT: CPT

## 2022-02-04 RX ORDER — AZITHROMYCIN 250 MG/1
TABLET, FILM COATED ORAL
Qty: 6 TABLET | Refills: 0 | Status: SHIPPED | OUTPATIENT
Start: 2022-02-04

## 2022-02-04 NOTE — ED PROVIDER NOTES
EMERGENCY DEPARTMENT HISTORY AND PHYSICAL EXAM      Date: 2/4/2022  Patient Name: Raul Jacinto    History of Presenting Illness     No chief complaint on file. History Provided By: Patient    HPI: Raul Jacinto, 80 y.o. female with a past medical history significant for HTN, ESRD, and COPD who presents to the ED with cc of cough and shortness of breath x 1 week. Pt additional c/o a decreased appetite, generalized malaise, congestion, and body aches, which have been progressively worsening for the past week. Pt states that she is otherwise well and has no further concerns. She specifically denies any chest pain, fever, chills, cough, abdominal pain, nausea, vomit, diarrhea, difficulty urinating, dysuria, hematuria, headaches, light headedness, dizziness, diaphoresis, or rash. There are no other complaints, changes, or physical findings at this time. PCP: Tulio Richards MD    No current facility-administered medications on file prior to encounter. Current Outpatient Medications on File Prior to Encounter   Medication Sig Dispense Refill    albuterol (PROVENTIL HFA, VENTOLIN HFA, PROAIR HFA) 90 mcg/actuation inhaler Take 2 Puffs by inhalation every four (4) hours as needed for Wheezing. 1 Inhaler 0    dextromethorphan-guaiFENesin (Robitussin Cough-Chest Prosper DM) 5-100 mg/5 mL liqd Take 5 mL by mouth every six (6) hours. 200 mL 0       Past History     Past Medical History:  Past Medical History:   Diagnosis Date    Chronic obstructive pulmonary disease ([facility] [address]     ESRD on hemodialysis ([facility] [address]     Hypertension        Past Surgical History:  History reviewed. No pertinent surgical history. Family History:  History reviewed. No pertinent family history. Social History:  Social History     Tobacco Use    Smoking status: Former Smoker    Smokeless tobacco: Never Used   Substance Use Topics    Alcohol use: Never    Drug use: Never       Allergies:   Allergies   Allergen Reactions  Percocet [Oxycodone-Acetaminophen] Itching         Review of Systems     Review of Systems   Constitutional: Positive for appetite change. Negative for chills, diaphoresis and fever. Malaise   HENT: Positive for congestion. Negative for rhinorrhea and sore throat. Eyes: Negative. Respiratory: Positive for cough and shortness of breath. Negative for chest tightness and wheezing. Cardiovascular: Negative. Negative for chest pain and palpitations. Gastrointestinal: Negative. Negative for abdominal pain, diarrhea, nausea and vomiting. Genitourinary: Negative. Negative for difficulty urinating, dysuria, flank pain, frequency and hematuria. Musculoskeletal: Positive for myalgias. Skin: Negative. Negative for rash. Neurological: Negative. Negative for dizziness, syncope, weakness, light-headedness, numbness and headaches. Psychiatric/Behavioral: Negative. All other systems reviewed and are negative. Physical Exam     Physical Exam  Vitals and nursing note reviewed. Constitutional:       General: She is not in acute distress. Appearance: Normal appearance. She is not ill-appearing or toxic-appearing. HENT:      Head: Normocephalic and atraumatic. Mouth/Throat:      Mouth: Mucous membranes are moist.      Pharynx: Oropharynx is clear. Eyes:      Extraocular Movements: Extraocular movements intact. Conjunctiva/sclera: Conjunctivae normal.      Pupils: Pupils are equal, round, and reactive to light. Pulmonary:      Effort: Pulmonary effort is normal.      Breath sounds: Normal breath sounds. No wheezing, rhonchi or rales. Abdominal:      General: There is no distension. Palpations: Abdomen is soft. Tenderness: There is no abdominal tenderness. There is no guarding or rebound. Musculoskeletal:      Cervical back: Neck supple. No tenderness. Skin:     General: Skin is warm and dry. Capillary Refill: Capillary refill takes less than 2 seconds. Findings: No rash. Neurological:      General: No focal deficit present. Mental Status: She is alert and oriented to person, place, and time. Psychiatric:         Mood and Affect: Mood normal.         Behavior: Behavior normal.         Lab and Diagnostic Study Results     Labs -     No results found for this or any previous visit (from the past 12 hour(s)). Radiologic Studies -   @lastxrresult@  CT Results  (Last 48 hours)    None        CXR Results  (Last 48 hours)               02/04/22 1011  XR CHEST SNGL V Final result    Narrative:  1 view comparison 4/29/2018       Small-to-moderate left and trace right pleural effusion. Clear lungs. Cardiomegaly and congestion               Medical Decision Making   - I am the first provider for this patient. - I reviewed the vital signs, available nursing notes, past medical history, past surgical history, family history and social history. - Initial assessment performed. The patients presenting problems have been discussed, and they are in agreement with the care plan formulated and outlined with them. I have encouraged them to ask questions as they arise throughout their visit. Vital Signs-Reviewed the patient's vital signs. No data found. Records Reviewed: Nursing Notes and Old Medical Records       Provider Notes (Medical Decision Making):     MDM  Number of Diagnoses or Management Options  Acute URI  Chronic bronchitis, unspecified chronic bronchitis type ([facility] [address]  ESRD (end stage renal disease) ([facility] [address]  Diagnosis management comments: Patient presents with acute dyspnea. DDx: asthma, copd, pna, pulmonary edema, acute bronchitis, ACS, ptx, pna. Will obtain EKG, labs, CXR, provide O2 as needed for hypoxia, treat symptomatically and reassess. Will continue to monitor closely in ED.         Amount and/or Complexity of Data Reviewed  Clinical lab tests: ordered and reviewed  Tests in the radiology section of CPT®: ordered and reviewed  Tests in the medicine section of CPT®: ordered and reviewed  Review and summarize past medical records: yes  Independent visualization of images, tracings, or specimens: yes       ED Course:   2:15 PM  Pt has dialysis treatment on Monday 10:45 AM.  She feels well enough to be discharged home and agrees to return promptly to ED shoulder her condition deteriorate prior to her dialysis treatment on Monday. She has remained stable during entire ED stay. Pt reports feeling better at this time and has no additnoal complaints or concerns. Procedures   Medical Decision Makingedical Decision Making  Performed by: Reina Licona PA-C  PROCEDURES:  Procedures       Disposition   Disposition: DC- Adult Discharges: All of the diagnostic tests were reviewed and questions answered. Diagnosis, care plan and treatment options were discussed. The patient understands the instructions and will follow up as directed. The patients results have been reviewed with them. They have been counseled regarding their diagnosis. The patient verbally convey understanding and agreement of the signs, symptoms, diagnosis, treatment and prognosis and additionally agrees to follow up as recommended with their PCP in 24 - 48 hours. They also agree with the care-plan and convey that all of their questions have been answered. I have also put together some discharge instructions for them that include: 1) educational information regarding their diagnosis, 2) how to care for their diagnosis at home, as well a 3) list of reasons why they would want to return to the ED prior to their follow-up appointment, should their condition change. Discharged    DISCHARGE PLAN:  1.    Current Discharge Medication List      CONTINUE these medications which have NOT CHANGED    Details   azithromycin (Zithromax Z-John) 250 mg tablet Take 2 tablet p.o. day 1 then 1 tablet p.o. day 2 through 5  Qty: 6 Tab, Refills: 0      albuterol (PROVENTIL HFA, VENTOLIN HFA, PROAIR HFA) 90 mcg/actuation inhaler Take 2 Puffs by inhalation every four (4) hours as needed for Wheezing. Qty: 1 Inhaler, Refills: 0      dextromethorphan-guaiFENesin (Robitussin Cough-Chest Prosper DM) 5-100 mg/5 mL liqd Take 5 mL by mouth every six (6) hours. Qty: 200 mL, Refills: 0           2. Follow-up Information     Follow up With Specialties Details Why Contact Info    Dontrell Connelly MD Nephrology  on Monday for dialysis [address] [facility]  [phone]          3. Return to ED if worse   4. Discharge Medication List as of 2/4/2022  2:21 PM      CONTINUE these medications which have CHANGED    Details   azithromycin (Zithromax Z-John) 250 mg tablet Take 2 tablet p.o. day 1 then 1 tablet p.o. day 2 through 5, Normal, Disp-6 Tablet, R-0         CONTINUE these medications which have NOT CHANGED    Details   albuterol (PROVENTIL HFA, VENTOLIN HFA, PROAIR HFA) 90 mcg/actuation inhaler Take 2 Puffs by inhalation every four (4) hours as needed for Wheezing., Normal, Disp-1 Inhaler,R-0      dextromethorphan-guaiFENesin (Robitussin Cough-Chest Prosper DM) 5-100 mg/5 mL liqd Take 5 mL by mouth every six (6) hours. , Normal, Disp-200 mL,R-0               Diagnosis     Clinical Impression:   1. Acute URI    2. Chronic bronchitis, unspecified chronic bronchitis type ([facility] [address]    3. ESRD (end stage renal disease) ([facility] [address]        Attestations:    Chasity Murdock PA-C    Please note that this dictation was completed with [employer], the computer voice recognition software. Quite often unanticipated grammatical, syntax, homophones, and other interpretive errors are inadvertently transcribed by the computer software. Please disregard these errors. Please excuse any errors that have escaped final proofreading. Thank you.

## 2022-02-04 NOTE — ED TRIAGE NOTES
Productive cough, fatigue, poor appetite, nasal congestion, chest tightness, shortness of breath, and body aches x1 wk increasingly worse. Hx COPD and ESRD. Reports missing x1 episode of dialysis last week. Also reports missed dialysis this morning to come to ED.

## 2022-02-05 LAB
SARS-COV-2, XPLCVT: NOT DETECTED
SOURCE, COVRS: NORMAL

## 2023-01-27 ENCOUNTER — HOSPITAL ENCOUNTER (EMERGENCY)
Age: 73
Discharge: HOME | End: 2023-01-27
Attending: STUDENT IN AN ORGANIZED HEALTH CARE EDUCATION/TRAINING PROGRAM | Admitting: STUDENT IN AN ORGANIZED HEALTH CARE EDUCATION/TRAINING PROGRAM
Payer: MEDICARE

## 2023-01-27 VITALS
OXYGEN SATURATION: 93 % | DIASTOLIC BLOOD PRESSURE: 76 MMHG | BODY MASS INDEX: 26.64 KG/M2 | RESPIRATION RATE: 16 BRPM | HEIGHT: 61 IN | TEMPERATURE: 99.1 F | WEIGHT: 141.09 LBS | HEART RATE: 75 BPM | SYSTOLIC BLOOD PRESSURE: 152 MMHG

## 2023-01-27 DIAGNOSIS — T82.838A: Primary | ICD-10-CM

## 2023-01-27 LAB
ALBUMIN SERPL-MCNC: 3.8 G/DL (ref 3.5–5)
ALBUMIN/GLOB SERPL: 1.1 (ref 1.1–2.2)
ALP SERPL-CCNC: 80 U/L (ref 45–117)
ALT SERPL-CCNC: 9 U/L (ref 12–78)
ANION GAP SERPL CALC-SCNC: 4 MMOL/L (ref 5–15)
AST SERPL W P-5'-P-CCNC: 26 U/L (ref 15–37)
BASOPHILS # BLD: 0 K/UL (ref 0–0.1)
BASOPHILS NFR BLD: 1 % (ref 0–1)
BILIRUB SERPL-MCNC: 0.7 MG/DL (ref 0.2–1)
BUN SERPL-MCNC: 13 MG/DL (ref 6–20)
BUN/CREAT SERPL: 3 (ref 12–20)
CA-I BLD-MCNC: 8.7 MG/DL (ref 8.5–10.1)
CHLORIDE SERPL-SCNC: 96 MMOL/L (ref 97–108)
CO2 SERPL-SCNC: 36 MMOL/L (ref 21–32)
CREAT SERPL-MCNC: 3.82 MG/DL (ref 0.55–1.02)
DIFFERENTIAL METHOD BLD: ABNORMAL
EOSINOPHIL # BLD: 0.2 K/UL (ref 0–0.4)
EOSINOPHIL NFR BLD: 5 % (ref 0–7)
ERYTHROCYTE [DISTWIDTH] IN BLOOD BY AUTOMATED COUNT: 14.4 % (ref 11.5–14.5)
GLOBULIN SER CALC-MCNC: 3.5 G/DL (ref 2–4)
GLUCOSE SERPL-MCNC: 88 MG/DL (ref 65–100)
HCT VFR BLD AUTO: 28.3 % (ref 35–47)
HGB BLD-MCNC: 8.8 G/DL (ref 11.5–16)
IMM GRANULOCYTES # BLD AUTO: 0 K/UL (ref 0–0.04)
IMM GRANULOCYTES NFR BLD AUTO: 1 % (ref 0–0.5)
INR PPP: 1.3 (ref 0.9–1.1)
LYMPHOCYTES # BLD: 1 K/UL (ref 0.8–3.5)
LYMPHOCYTES NFR BLD: 23 % (ref 12–49)
MCH RBC QN AUTO: 30.1 PG (ref 26–34)
MCHC RBC AUTO-ENTMCNC: 31.1 G/DL (ref 30–36.5)
MCV RBC AUTO: 96.9 FL (ref 80–99)
MONOCYTES # BLD: 0.4 K/UL (ref 0–1)
MONOCYTES NFR BLD: 9 % (ref 5–13)
NEUTS SEG # BLD: 2.6 K/UL (ref 1.8–8)
NEUTS SEG NFR BLD: 61 % (ref 32–75)
NRBC # BLD: 0 K/UL (ref 0–0.01)
NRBC BLD-RTO: 0 PER 100 WBC
PLATELET # BLD AUTO: 126 K/UL (ref 150–400)
PMV BLD AUTO: 10.5 FL (ref 8.9–12.9)
POTASSIUM SERPL-SCNC: 3.9 MMOL/L (ref 3.5–5.1)
PROT SERPL-MCNC: 7.3 G/DL (ref 6.4–8.2)
PROTHROMBIN TIME: 16.4 SEC (ref 11.9–14.6)
RBC # BLD AUTO: 2.92 M/UL (ref 3.8–5.2)
SODIUM SERPL-SCNC: 136 MMOL/L (ref 136–145)
WBC # BLD AUTO: 4.2 K/UL (ref 3.6–11)

## 2023-01-27 PROCEDURE — 80053 COMPREHEN METABOLIC PANEL: CPT

## 2023-01-27 PROCEDURE — 85025 COMPLETE CBC W/AUTO DIFF WBC: CPT

## 2023-01-27 PROCEDURE — 85610 PROTHROMBIN TIME: CPT

## 2023-01-27 PROCEDURE — 99283 EMERGENCY DEPT VISIT LOW MDM: CPT

## 2023-01-27 PROCEDURE — 36415 COLL VENOUS BLD VENIPUNCTURE: CPT

## 2023-01-27 NOTE — ED TRIAGE NOTES
Pt arrives via EMS from [facility] for uncontrolled bleeding at fistula site post HD. Bleeding is controled with a clamp at this time.

## 2023-01-27 NOTE — ED PROVIDER NOTES
[facility] EMERGENCY DEPT  EMERGENCY DEPARTMENT HISTORY AND PHYSICAL EXAM      Date: 1/27/2023  Patient Name: Ning Tejada  MRN: [id number]  [place] 1950  Date of evaluation: 1/27/2023  Provider: Chuck Banks MD   Note Started: 4:07 PM 1/27/23    HISTORY OF PRESENT ILLNESS     Chief Complaint   Patient presents with    Bleeding/Bruising     At AVF site post HD       History Provided By: Patient    HPI: Ning Tejada, 71 y.o. female with past medical history of ESRD on HD presents for evaluation of bleeding fistula. Fistula started bleeding after she completed her dialysis session today. Fistula was clamped and patient was sent to the emergency department for evaluation. No persistent bleeding from her site. No history of bleeding at the site. No history of clotting or bleeding disorder. Not on any blood thinners. No other sources of bleeding. PAST MEDICAL HISTORY   Past Medical History:  Past Medical History:   Diagnosis Date    ESRD on hemodialysis ([address]     Hypertension        Past Surgical History:  History reviewed. No pertinent surgical history. Family History:  History reviewed. No pertinent family history. Social History:  Social History     Tobacco Use    Smoking status: Former    Smokeless tobacco: Never   Substance Use Topics    Alcohol use: Never    Drug use: Never       Allergies: Allergies   Allergen Reactions    Percocet [Oxycodone-Acetaminophen] Itching       PCP: Issa Lala MD    Current Meds:   Previous Medications    ALBUTEROL (PROVENTIL HFA, VENTOLIN HFA, PROAIR HFA) 90 MCG/ACTUATION INHALER    Take 2 Puffs by inhalation every four (4) hours as needed for Wheezing. AZITHROMYCIN (ZITHROMAX Z-JALEN) 250 MG TABLET    Take 2 tablet p.o. day 1 then 1 tablet p.o. day 2 through 5    DEXTROMETHORPHAN-GUAIFENESIN (ROBITUSSIN COUGH-CHEST KEMAL DM) 5-100 MG/5 ML LIQD    Take 5 mL by mouth every six (6) hours.        REVIEW OF SYSTEMS   Review of Systems   Constitutional: Negative for fever. HENT:  Negative for congestion. Respiratory:  Negative for cough and shortness of breath. Cardiovascular:  Negative for chest pain. Gastrointestinal:  Negative for abdominal pain, constipation, nausea and vomiting. Genitourinary:  Negative for dysuria. Musculoskeletal:  Negative for arthralgias and myalgias. Skin:  Negative for rash. Allergic/Immunologic: Negative for immunocompromised state. Neurological:  Negative for syncope. Psychiatric/Behavioral:  Negative for confusion. Positives and Pertinent negatives as per HPI. PHYSICAL EXAM     ED Triage Vitals   ED Encounter Vitals Group      BP 01/27/23 1416 (!) 182/75      Pulse (Heart Rate) 01/27/23 1416 76      Resp Rate 01/27/23 1416 16      Temp 01/27/23 1416 97.9 °F (36.6 °C)      Temp src --       O2 Sat (%) 01/27/23 1416 92 %      Weight 01/27/23 1415 141 lb 1.5 oz      Height 01/27/23 1415 5' 1\"     Physical Exam  Constitutional:       Appearance: Normal appearance. HENT:      Head: Normocephalic and atraumatic. Nose: Nose normal.      Mouth/Throat:      Mouth: Mucous membranes are moist.   Eyes:      Conjunctiva/sclera: Conjunctivae normal.      Pupils: Pupils are equal, round, and reactive to light. Cardiovascular:      Rate and Rhythm: Normal rate and regular rhythm. Heart sounds: Normal heart sounds. Comments: Right-sided AV fistula with clamp in place. Distal radial pulse 2+, arm warm and well-perfused. Pulmonary:      Effort: Pulmonary effort is normal.      Breath sounds: Normal breath sounds. Abdominal:      General: There is no distension. Palpations: Abdomen is soft. Tenderness: There is no abdominal tenderness. Musculoskeletal:         General: No tenderness or deformity. Normal range of motion. Cervical back: Normal range of motion and neck supple. Skin:     General: Skin is warm and dry. Neurological:      General: No focal deficit present.       Mental Status: She is alert and oriented to person, place, and time. Psychiatric:         Mood and Affect: Mood normal.         Behavior: Behavior normal.          SCREENINGS               No data recorded        LAB, EKG AND DIAGNOSTIC RESULTS   Labs:  No results found for this or any previous visit (from the past 12 hour(s)). Radiologic Studies:  Interpretation per the Radiologist below, if available at the time of this note:  No results found. PROCEDURES   Unless otherwise noted below, none  Performed by: Lexii Wood MD   Procedures        EMERGENCY DEPARTMENT COURSE and DIFFERENTIAL DIAGNOSIS/MDM   Vitals:    Vitals:    01/27/23 1415 01/27/23 1416   BP:  (!) 182/75   Pulse:  76   Resp:  16   Temp:  97.9 °F (36.6 °C)   SpO2:  92%   Weight: 64 kg (141 lb 1.5 oz)    Height: 5' 1\" (1.549 m)         Patient was given the following medications:  Medications - No data to display    CC/HPI Summary, DDx, ED Course, and Reassessment:   71-year-old female presents for evaluation of right-sided AV fistula. Patient presents with clamp in place. Arm is warm well perfused. Will trial off of fistula clamp. Checking CBC, CMP and coags for evidence of disordered clotting    No persistent bleeding. Lab work benign. Return precautions given. ED FINAL IMPRESSION     1. Bleeding pseudoaneurysm of right brachiocephalic arteriovenous fistula, initial encounter [facility])          DISPOSITION/PLAN       Discharge Note: The patient is stable for discharge home. The signs, symptoms, diagnosis, and discharge instructions have been discussed, understanding conveyed, and agreed upon. The patient is to follow up as recommended or return to ER should their symptoms worsen.       PATIENT REFERRED TO:  Follow-up Information       Follow up With Specialties Details Why Contact Info    Virgilio Hernandez MD Nephrology   [facility] [address]  [phone]      [facility] EMERGENCY DEPT Emergency Medicine  As needed, If symptoms worsen [address] [id number] [phone]              DISCHARGE MEDICATIONS:  Current Discharge Medication List            DISCONTINUED MEDICATIONS:  Current Discharge Medication List          I am the Primary Clinician of Record. Celine Oliveira MD (electronically signed)    (Please note that parts of this dictation were completed with voice recognition software. Quite often unanticipated grammatical, syntax, homophones, and other interpretive errors are inadvertently transcribed by the computer software. Please disregards these errors.  Please excuse any errors that have escaped final proofreading.)

## 2023-09-20 ENCOUNTER — APPOINTMENT (OUTPATIENT)
Facility: HOSPITAL | Age: 73
End: 2023-09-20
Payer: MEDICARE

## 2023-09-20 ENCOUNTER — HOSPITAL ENCOUNTER (EMERGENCY)
Facility: HOSPITAL | Age: 73
Discharge: HOME | End: 2023-09-20
Payer: MEDICARE

## 2023-09-20 VITALS
BODY MASS INDEX: 26.64 KG/M2 | TEMPERATURE: 98.2 F | OXYGEN SATURATION: 100 % | HEART RATE: 68 BPM | RESPIRATION RATE: 16 BRPM | HEIGHT: 61 IN | DIASTOLIC BLOOD PRESSURE: 69 MMHG | WEIGHT: 141.09 LBS | SYSTOLIC BLOOD PRESSURE: 144 MMHG

## 2023-09-20 DIAGNOSIS — J98.4 PULMONARY LESION, LEFT: ICD-10-CM

## 2023-09-20 DIAGNOSIS — M75.42 SUBACROMIAL IMPINGEMENT, LEFT: ICD-10-CM

## 2023-09-20 DIAGNOSIS — M12.812 ROTATOR CUFF ARTHROPATHY, LEFT: Primary | ICD-10-CM

## 2023-09-20 PROCEDURE — 6370000000 HC RX 637 (ALT 250 FOR IP): Performed by: PHYSICIAN ASSISTANT

## 2023-09-20 PROCEDURE — 73030 X-RAY EXAM OF SHOULDER: CPT

## 2023-09-20 PROCEDURE — 71250 CT THORAX DX C-: CPT

## 2023-09-20 PROCEDURE — 73060 X-RAY EXAM OF HUMERUS: CPT

## 2023-09-20 PROCEDURE — 99284 EMERGENCY DEPT VISIT MOD MDM: CPT

## 2023-09-20 PROCEDURE — 71046 X-RAY EXAM CHEST 2 VIEWS: CPT

## 2023-09-20 RX ORDER — TRAMADOL HYDROCHLORIDE 50 MG/1
50 TABLET ORAL EVERY 8 HOURS PRN
Qty: 9 TABLET | Refills: 0 | Status: SHIPPED | OUTPATIENT
Start: 2023-09-20 | End: 2023-09-23

## 2023-09-20 RX ORDER — METHOCARBAMOL 500 MG/1
1000 TABLET, FILM COATED ORAL
Status: COMPLETED | OUTPATIENT
Start: 2023-09-20 | End: 2023-09-20

## 2023-09-20 RX ORDER — TRAMADOL HYDROCHLORIDE 50 MG/1
50 TABLET ORAL
Status: COMPLETED | OUTPATIENT
Start: 2023-09-20 | End: 2023-09-20

## 2023-09-20 RX ADMIN — METHOCARBAMOL 1000 MG: 500 TABLET ORAL at 19:16

## 2023-09-20 RX ADMIN — TRAMADOL HYDROCHLORIDE 50 MG: 50 TABLET ORAL at 20:57

## 2023-09-20 ASSESSMENT — PAIN SCALES - GENERAL
PAINLEVEL_OUTOF10: 10

## 2023-09-20 ASSESSMENT — PAIN DESCRIPTION - ORIENTATION
ORIENTATION: LEFT

## 2023-09-20 ASSESSMENT — PAIN DESCRIPTION - LOCATION
LOCATION: SHOULDER
LOCATION: SHOULDER
LOCATION: ARM
LOCATION: SHOULDER

## 2023-09-20 ASSESSMENT — PAIN - FUNCTIONAL ASSESSMENT
PAIN_FUNCTIONAL_ASSESSMENT: 0-10
PAIN_FUNCTIONAL_ASSESSMENT: 0-10

## 2023-09-20 ASSESSMENT — LIFESTYLE VARIABLES
HOW MANY STANDARD DRINKS CONTAINING ALCOHOL DO YOU HAVE ON A TYPICAL DAY: PATIENT DOES NOT DRINK
HOW OFTEN DO YOU HAVE A DRINK CONTAINING ALCOHOL: NEVER

## 2023-09-20 NOTE — ED TRIAGE NOTES
Pt believes she dislocated her left shoulder. Pt was cleaning out the freezer and heard a pop.  Pt is complaining of pain in her left bicep area

## 2023-09-20 NOTE — ED PROVIDER NOTES
[facility] EMERGENCY DEPT  EMERGENCY DEPARTMENT HISTORY AND PHYSICAL EXAM      Date: 9/20/2023  Patient Name: Krystle Bear  MRN: [id number]  [address] [facility] [address] [facility] 1950  Date of evaluation: 9/20/2023  Provider: Mnig Oneill PA-C   Note Started: 4:29 PM EDT 9/20/23    HISTORY OF PRESENT ILLNESS     Chief Complaint   Patient presents with    Arm Pain       History Provided By: Patient    HPI: Krystle Bear is a 68 y.o. female with a past medical history significant for HTN and ESRD who presents this ED with CC of left shoulder pain. Patient reports that she was pulling something out of the back of her freezer this morning when she heard a \"pop\" in her left shoulder and had sudden onset of shoulder pain. States that her pain is exacerbated with movement, especially with overhead reaching and mildly relieved while at rest.  Denies any additional injury. Denies treating symptoms anything. Patient still denies any chest pain, shortness of breath, palpitations, abdominal pain, nausea, vomiting, headaches, lightheadedness, dizziness, numbness, weakness, neck pain, changes in bowel or bladder habits. States that she otherwise well has no further concerns    PAST MEDICAL HISTORY   Past Medical History:  Past Medical History:   Diagnosis Date    ESRD on hemodialysis ([address])     Hypertension        Past Surgical History:  No past surgical history on file. Family History:  No family history on file. Social History:  Social History     Tobacco Use    Smoking status: Former    Smokeless tobacco: Never   Substance Use Topics    Alcohol use: Never    Drug use: Never       Allergies: Allergies   Allergen Reactions    Oxycodone-Acetaminophen Itching       PCP: Meghan Delatorre MD    Current Meds:   No current facility-administered medications for this encounter.      Current Outpatient Medications   Medication Sig Dispense Refill    traMADol (ULTRAM) 50 MG tablet Take 1 tablet by mouth every 8 hours as needed for Pain for up to 3

## 2023-09-20 NOTE — DISCHARGE INSTRUCTIONS
Thank you! Thank you for allowing me to care for you in the emergency department. It is my goal to provide you with excellent care. If you have not received excellent quality care, please ask to speak to the nurse manager. Please fill out the survey that will come to you by mail or email since we listen to your feedback! Below you will find a list of your tests from today's visit. Should you have any questions, please do not hesitate to call the emergency department. Labs  No results found for this or any previous visit (from the past 12 hour(s)). Radiologic Studies  CT CHEST WO CONTRAST   Final Result   1.  3.8 x 3.4 cm left upper lobe mass, with adjacent satellite masses/nodules,   concerning for malignancy. Numerous tiny bilateral nodules are likely   metastatic. 2.  Bilateral breast skin/subcutaneous soft tissue thickening. 3.7 x 2.5 cm   calcified mass in the left breast.   3.  Partially visualized retroperitoneal mass in the upper abdomen, may   represent adenopathy. Recommend CT of the abdomen/pelvis, with contrast if   possible. 4.  Trace ascites in the visualized upper abdomen. 23X            XR CHEST (2 VW)   Final Result      3.9 cm rounded left upper lung opacity, concerning for a pulmonary lesion. Further evaluation with chest CT is recommended. XR HUMERUS LEFT (MIN 2 VIEWS)   Final Result      Osteopenic left humerus   Patchy left upper lobe airspace disease, for which chest radiography is   recommended. XR SHOULDER LEFT (MIN 2 VIEWS)   Final Result   1. Possible left upper lobe mass. Chest x-ray is recommended for further   evaluation. 2. No acute osseous abnormalities. 3. Diffuse osteopenia of the left shoulder. ------------------------------------------------------------------------------------------------------------  The exam and treatment you received in the Emergency Department were for an urgent problem and are not intended as complete care.  It

## 2023-10-04 ENCOUNTER — TRANSCRIBE ORDERS (OUTPATIENT)
Facility: HOSPITAL | Age: 73
End: 2023-10-04

## 2023-10-04 DIAGNOSIS — N63.20 MASS OF LEFT BREAST, UNSPECIFIED QUADRANT: Primary | ICD-10-CM

## 2023-10-04 DIAGNOSIS — R59.0 ABDOMINAL LYMPHADENOPATHY: Primary | ICD-10-CM

## 2023-10-04 DIAGNOSIS — R19.00 RETROPERITONEAL MASS: ICD-10-CM

## 2023-10-10 ENCOUNTER — APPOINTMENT (OUTPATIENT)
Facility: HOSPITAL | Age: 73
End: 2023-10-10
Payer: MEDICARE

## 2023-10-10 ENCOUNTER — HOSPITAL ENCOUNTER (EMERGENCY)
Facility: HOSPITAL | Age: 73
Discharge: ANOTHER HOSPITAL | End: 2023-10-10
Attending: EMERGENCY MEDICINE
Payer: MEDICARE

## 2023-10-10 VITALS
SYSTOLIC BLOOD PRESSURE: 142 MMHG | TEMPERATURE: 99.1 F | OXYGEN SATURATION: 97 % | HEART RATE: 79 BPM | RESPIRATION RATE: 20 BRPM | DIASTOLIC BLOOD PRESSURE: 59 MMHG

## 2023-10-10 DIAGNOSIS — G93.89 BRAIN MASS: Primary | ICD-10-CM

## 2023-10-10 DIAGNOSIS — H53.2 DIPLOPIA: ICD-10-CM

## 2023-10-10 LAB
ALBUMIN SERPL-MCNC: 3.3 G/DL (ref 3.5–5)
ALBUMIN/GLOB SERPL: 0.8 (ref 1.1–2.2)
ALP SERPL-CCNC: 94 U/L (ref 45–117)
ALT SERPL-CCNC: 10 U/L (ref 12–78)
ANION GAP SERPL CALC-SCNC: 7 MMOL/L (ref 5–15)
AST SERPL W P-5'-P-CCNC: 16 U/L (ref 15–37)
BASOPHILS # BLD: 0 K/UL (ref 0–0.1)
BASOPHILS NFR BLD: 1 % (ref 0–1)
BILIRUB SERPL-MCNC: 0.8 MG/DL (ref 0.2–1)
BUN SERPL-MCNC: 22 MG/DL (ref 6–20)
BUN/CREAT SERPL: 4 (ref 12–20)
CA-I BLD-MCNC: 8.8 MG/DL (ref 8.5–10.1)
CHLORIDE SERPL-SCNC: 94 MMOL/L (ref 97–108)
CO2 SERPL-SCNC: 35 MMOL/L (ref 21–32)
CREAT SERPL-MCNC: 5.98 MG/DL (ref 0.55–1.02)
DIFFERENTIAL METHOD BLD: ABNORMAL
EKG ATRIAL RATE: 77 BPM
EKG DIAGNOSIS: NORMAL
EKG P AXIS: 46 DEGREES
EKG P-R INTERVAL: 138 MS
EKG Q-T INTERVAL: 400 MS
EKG QRS DURATION: 78 MS
EKG QTC CALCULATION (BAZETT): 452 MS
EKG R AXIS: -69 DEGREES
EKG T AXIS: -71 DEGREES
EKG VENTRICULAR RATE: 77 BPM
EOSINOPHIL # BLD: 0.2 K/UL (ref 0–0.4)
EOSINOPHIL NFR BLD: 3 % (ref 0–7)
ERYTHROCYTE [DISTWIDTH] IN BLOOD BY AUTOMATED COUNT: 14.5 % (ref 11.5–14.5)
GLOBULIN SER CALC-MCNC: 3.9 G/DL (ref 2–4)
GLUCOSE BLD STRIP.AUTO-MCNC: 64 MG/DL (ref 65–100)
GLUCOSE BLD STRIP.AUTO-MCNC: 74 MG/DL (ref 65–100)
GLUCOSE SERPL-MCNC: 81 MG/DL (ref 65–100)
HCT VFR BLD AUTO: 24.9 % (ref 35–47)
HGB BLD-MCNC: 7.9 G/DL (ref 11.5–16)
IMM GRANULOCYTES # BLD AUTO: 0 K/UL (ref 0–0.04)
IMM GRANULOCYTES NFR BLD AUTO: 1 % (ref 0–0.5)
INR PPP: 1.4 (ref 0.9–1.1)
LYMPHOCYTES # BLD: 1.3 K/UL (ref 0.8–3.5)
LYMPHOCYTES NFR BLD: 21 % (ref 12–49)
MCH RBC QN AUTO: 29.4 PG (ref 26–34)
MCHC RBC AUTO-ENTMCNC: 31.7 G/DL (ref 30–36.5)
MCV RBC AUTO: 92.6 FL (ref 80–99)
MONOCYTES # BLD: 0.7 K/UL (ref 0–1)
MONOCYTES NFR BLD: 12 % (ref 5–13)
NEUTS SEG # BLD: 3.9 K/UL (ref 1.8–8)
NEUTS SEG NFR BLD: 62 % (ref 32–75)
NRBC # BLD: 0 K/UL (ref 0–0.01)
NRBC BLD-RTO: 0 PER 100 WBC
PERFORMED BY:: ABNORMAL
PERFORMED BY:: NORMAL
PLATELET # BLD AUTO: 170 K/UL (ref 150–400)
PMV BLD AUTO: 9.8 FL (ref 8.9–12.9)
POTASSIUM SERPL-SCNC: 4.2 MMOL/L (ref 3.5–5.1)
PROT SERPL-MCNC: 7.2 G/DL (ref 6.4–8.2)
PROTHROMBIN TIME: 17.2 SEC (ref 11.9–14.6)
RBC # BLD AUTO: 2.69 M/UL (ref 3.8–5.2)
SODIUM SERPL-SCNC: 136 MMOL/L (ref 136–145)
TROPONIN I SERPL HS-MCNC: 17 NG/L (ref 0–51)
WBC # BLD AUTO: 6.2 K/UL (ref 3.6–11)

## 2023-10-10 PROCEDURE — 82962 GLUCOSE BLOOD TEST: CPT

## 2023-10-10 PROCEDURE — 85025 COMPLETE CBC W/AUTO DIFF WBC: CPT

## 2023-10-10 PROCEDURE — 84484 ASSAY OF TROPONIN QUANT: CPT

## 2023-10-10 PROCEDURE — 85610 PROTHROMBIN TIME: CPT

## 2023-10-10 PROCEDURE — 80053 COMPREHEN METABOLIC PANEL: CPT

## 2023-10-10 PROCEDURE — 93005 ELECTROCARDIOGRAM TRACING: CPT | Performed by: EMERGENCY MEDICINE

## 2023-10-10 PROCEDURE — 71045 X-RAY EXAM CHEST 1 VIEW: CPT

## 2023-10-10 PROCEDURE — 36415 COLL VENOUS BLD VENIPUNCTURE: CPT

## 2023-10-10 PROCEDURE — 70450 CT HEAD/BRAIN W/O DYE: CPT

## 2023-10-10 PROCEDURE — 99285 EMERGENCY DEPT VISIT HI MDM: CPT

## 2023-10-10 PROCEDURE — 6370000000 HC RX 637 (ALT 250 FOR IP): Performed by: EMERGENCY MEDICINE

## 2023-10-10 RX ORDER — ASPIRIN 325 MG
325 TABLET ORAL
Status: COMPLETED | OUTPATIENT
Start: 2023-10-10 | End: 2023-10-10

## 2023-10-10 RX ADMIN — ASPIRIN 325 MG: 325 TABLET ORAL at 10:52

## 2023-10-10 ASSESSMENT — LIFESTYLE VARIABLES
HOW OFTEN DO YOU HAVE A DRINK CONTAINING ALCOHOL: NEVER
HOW MANY STANDARD DRINKS CONTAINING ALCOHOL DO YOU HAVE ON A TYPICAL DAY: PATIENT DOES NOT DRINK

## 2023-10-10 NOTE — ED NOTES
Pt's initial BGL was 64. Pt provided with apple juice and BGL rechecked. Jeaneth to 74. Pt provided with more apple juice to ensure that BGL does not drop again.       Osman Hess RN  10/10/23 0479

## 2023-10-10 NOTE — ED TRIAGE NOTES
Pt arrives to ED with daughter. Pt reports at around 0700 today, she got dizzy and started having L-sided facial numbness as well with L eye blurry vision. L arm weakness. Level 1 stroke alert called at this time.

## 2023-10-10 NOTE — ED PROVIDER NOTES
this entire length of time I was immediately available to the patient . Lauren Salgado MD    ED FINAL IMPRESSION     1. Brain mass    2. Diplopia          DISPOSITION/PLAN   DISPOSITION Decision To Transfer 10/10/2023 10:25:01 AM    Transfer: The patient is being transferred to [address] [place] [address] 300 ED. The results of their tests and reasons for their transfer have been discussed with the patient and/or available family. The patient/family has conveyed agreement and understanding for the need to be transferred and for their diagnosis. Consultation has been made with auto accept by dr Maria Esther Mcmullen, who agrees to accept the transfer. PATIENT REFERRED TO:  No follow-up provider specified. DISCHARGE MEDICATIONS:     Medication List        ASK your doctor about these medications      albuterol sulfate  (90 Base) MCG/ACT inhaler  Commonly known as: PROVENTIL;VENTOLIN;PROAIR     azithromycin 250 MG tablet  Commonly known as: ZITHROMAX     Dextromethorphan-guaiFENesin 5-100 MG/5ML Liqd                DISCONTINUED MEDICATIONS:  Current Discharge Medication List          I am the Primary Clinician of Record. Lauren Salgado MD (electronically signed)    (Please note that parts of this dictation were completed with voice recognition software. Quite often unanticipated grammatical, syntax, homophones, and other interpretive errors are inadvertently transcribed by the computer software. Please disregards these errors.  Please excuse any errors that have escaped final proofreading.)       Lauren Salgado MD  10/10/23 6430

## 2023-11-28 PROBLEM — J90 PLEURAL EFFUSION: Status: ACTIVE | Noted: 2023-11-28

## 2023-12-02 PROBLEM — J96.00 ACUTE RESPIRATORY FAILURE (HCC): Status: ACTIVE | Noted: 2023-12-02

## 2023-12-23 ENCOUNTER — HOSPITAL ENCOUNTER (INPATIENT)
Facility: HOSPITAL | Age: 73
LOS: 13 days | DRG: 314 | End: 2024-01-05
Attending: EMERGENCY MEDICINE | Admitting: HOSPITALIST
Payer: MEDICARE

## 2023-12-23 ENCOUNTER — APPOINTMENT (OUTPATIENT)
Facility: HOSPITAL | Age: 73
DRG: 314 | End: 2023-12-23
Payer: MEDICARE

## 2023-12-23 DIAGNOSIS — R79.89 ELEVATED TROPONIN: ICD-10-CM

## 2023-12-23 DIAGNOSIS — I95.9 HYPOTENSION, UNSPECIFIED HYPOTENSION TYPE: Primary | ICD-10-CM

## 2023-12-23 LAB
ALBUMIN SERPL-MCNC: 3 G/DL (ref 3.5–5)
ALBUMIN/GLOB SERPL: 0.8 (ref 1.1–2.2)
ALP SERPL-CCNC: 83 U/L (ref 45–117)
ALT SERPL-CCNC: 7 U/L (ref 12–78)
ANION GAP SERPL CALC-SCNC: 7 MMOL/L (ref 5–15)
AST SERPL W P-5'-P-CCNC: 23 U/L (ref 15–37)
BASOPHILS # BLD: 0 K/UL (ref 0–0.1)
BASOPHILS NFR BLD: 0 % (ref 0–1)
BILIRUB SERPL-MCNC: 0.9 MG/DL (ref 0.2–1)
BNP SERPL-MCNC: ABNORMAL PG/ML
BUN SERPL-MCNC: 22 MG/DL (ref 6–20)
BUN/CREAT SERPL: 6 (ref 12–20)
CA-I BLD-MCNC: 10.2 MG/DL (ref 8.5–10.1)
CHLORIDE SERPL-SCNC: 98 MMOL/L (ref 97–108)
CO2 SERPL-SCNC: 28 MMOL/L (ref 21–32)
CREAT SERPL-MCNC: 3.52 MG/DL (ref 0.55–1.02)
DIFFERENTIAL METHOD BLD: ABNORMAL
EOSINOPHIL # BLD: 0.5 K/UL (ref 0–0.4)
EOSINOPHIL NFR BLD: 6 % (ref 0–7)
ERYTHROCYTE [DISTWIDTH] IN BLOOD BY AUTOMATED COUNT: 18.6 % (ref 11.5–14.5)
GLOBULIN SER CALC-MCNC: 3.7 G/DL (ref 2–4)
GLUCOSE SERPL-MCNC: 91 MG/DL (ref 65–100)
HCT VFR BLD AUTO: 26.5 % (ref 35–47)
HGB BLD-MCNC: 8.2 G/DL (ref 11.5–16)
IMM GRANULOCYTES # BLD AUTO: 0 K/UL (ref 0–0.04)
IMM GRANULOCYTES NFR BLD AUTO: 1 % (ref 0–0.5)
LIPASE SERPL-CCNC: 8 U/L (ref 13–75)
LYMPHOCYTES # BLD: 0.7 K/UL (ref 0.8–3.5)
LYMPHOCYTES NFR BLD: 10 % (ref 12–49)
MAGNESIUM SERPL-MCNC: 2.1 MG/DL (ref 1.6–2.4)
MCH RBC QN AUTO: 29.7 PG (ref 26–34)
MCHC RBC AUTO-ENTMCNC: 30.9 G/DL (ref 30–36.5)
MCV RBC AUTO: 96 FL (ref 80–99)
MONOCYTES # BLD: 0.7 K/UL (ref 0–1)
MONOCYTES NFR BLD: 9 % (ref 5–13)
NEUTS SEG # BLD: 5.3 K/UL (ref 1.8–8)
NEUTS SEG NFR BLD: 74 % (ref 32–75)
NRBC # BLD: 0.02 K/UL (ref 0–0.01)
NRBC BLD-RTO: 0.3 PER 100 WBC
PLATELET # BLD AUTO: 172 K/UL (ref 150–400)
PMV BLD AUTO: 10.4 FL (ref 8.9–12.9)
POTASSIUM SERPL-SCNC: 4.2 MMOL/L (ref 3.5–5.1)
PROT SERPL-MCNC: 6.7 G/DL (ref 6.4–8.2)
RBC # BLD AUTO: 2.76 M/UL (ref 3.8–5.2)
SODIUM SERPL-SCNC: 133 MMOL/L (ref 136–145)
TROPONIN I SERPL HS-MCNC: 148 NG/L (ref 0–51)
TROPONIN I SERPL HS-MCNC: 159 NG/L (ref 0–51)
WBC # BLD AUTO: 7.2 K/UL (ref 3.6–11)

## 2023-12-23 PROCEDURE — 3E043XZ INTRODUCTION OF VASOPRESSOR INTO CENTRAL VEIN, PERCUTANEOUS APPROACH: ICD-10-PCS | Performed by: EMERGENCY MEDICINE

## 2023-12-23 PROCEDURE — 80053 COMPREHEN METABOLIC PANEL: CPT

## 2023-12-23 PROCEDURE — 1100000000 HC RM PRIVATE

## 2023-12-23 PROCEDURE — 85025 COMPLETE CBC W/AUTO DIFF WBC: CPT

## 2023-12-23 PROCEDURE — 2500000003 HC RX 250 WO HCPCS: Performed by: EMERGENCY MEDICINE

## 2023-12-23 PROCEDURE — 99285 EMERGENCY DEPT VISIT HI MDM: CPT

## 2023-12-23 PROCEDURE — 83880 ASSAY OF NATRIURETIC PEPTIDE: CPT

## 2023-12-23 PROCEDURE — 83690 ASSAY OF LIPASE: CPT

## 2023-12-23 PROCEDURE — 84484 ASSAY OF TROPONIN QUANT: CPT

## 2023-12-23 PROCEDURE — 93005 ELECTROCARDIOGRAM TRACING: CPT | Performed by: STUDENT IN AN ORGANIZED HEALTH CARE EDUCATION/TRAINING PROGRAM

## 2023-12-23 PROCEDURE — 87040 BLOOD CULTURE FOR BACTERIA: CPT

## 2023-12-23 PROCEDURE — 83735 ASSAY OF MAGNESIUM: CPT

## 2023-12-23 PROCEDURE — 94761 N-INVAS EAR/PLS OXIMETRY MLT: CPT

## 2023-12-23 PROCEDURE — 2580000003 HC RX 258: Performed by: EMERGENCY MEDICINE

## 2023-12-23 PROCEDURE — 71045 X-RAY EXAM CHEST 1 VIEW: CPT

## 2023-12-23 PROCEDURE — 96366 THER/PROPH/DIAG IV INF ADDON: CPT

## 2023-12-23 PROCEDURE — 36415 COLL VENOUS BLD VENIPUNCTURE: CPT

## 2023-12-23 PROCEDURE — 96365 THER/PROPH/DIAG IV INF INIT: CPT

## 2023-12-23 RX ORDER — MIDODRINE HYDROCHLORIDE 5 MG/1
10 TABLET ORAL 2 TIMES DAILY
Status: DISCONTINUED | OUTPATIENT
Start: 2023-12-23 | End: 2023-12-25

## 2023-12-23 RX ORDER — PANTOPRAZOLE SODIUM 20 MG/1
20 TABLET, DELAYED RELEASE ORAL DAILY
Status: DISCONTINUED | OUTPATIENT
Start: 2023-12-23 | End: 2024-01-03

## 2023-12-23 RX ORDER — SODIUM CHLORIDE 0.9 % (FLUSH) 0.9 %
5-40 SYRINGE (ML) INJECTION PRN
Status: DISCONTINUED | OUTPATIENT
Start: 2023-12-23 | End: 2024-01-05 | Stop reason: HOSPADM

## 2023-12-23 RX ORDER — NOREPINEPHRINE BITARTRATE 0.06 MG/ML
1-100 INJECTION, SOLUTION INTRAVENOUS CONTINUOUS
Status: DISCONTINUED | OUTPATIENT
Start: 2023-12-23 | End: 2023-12-29

## 2023-12-23 RX ORDER — SODIUM CHLORIDE 9 MG/ML
INJECTION, SOLUTION INTRAVENOUS PRN
Status: DISCONTINUED | OUTPATIENT
Start: 2023-12-23 | End: 2024-01-05 | Stop reason: HOSPADM

## 2023-12-23 RX ORDER — ONDANSETRON 2 MG/ML
4 INJECTION INTRAMUSCULAR; INTRAVENOUS EVERY 6 HOURS PRN
Status: DISCONTINUED | OUTPATIENT
Start: 2023-12-23 | End: 2024-01-05 | Stop reason: HOSPADM

## 2023-12-23 RX ORDER — GUAIFENESIN 600 MG/1
600 TABLET, EXTENDED RELEASE ORAL 2 TIMES DAILY
Status: DISCONTINUED | OUTPATIENT
Start: 2023-12-23 | End: 2024-01-03

## 2023-12-23 RX ORDER — ASPIRIN 81 MG/1
81 TABLET ORAL DAILY
Status: DISCONTINUED | OUTPATIENT
Start: 2023-12-23 | End: 2024-01-03

## 2023-12-23 RX ORDER — SODIUM CHLORIDE 0.9 % (FLUSH) 0.9 %
5-40 SYRINGE (ML) INJECTION EVERY 12 HOURS SCHEDULED
Status: DISCONTINUED | OUTPATIENT
Start: 2023-12-23 | End: 2024-01-03

## 2023-12-23 RX ORDER — ALBUTEROL SULFATE 90 UG/1
2 AEROSOL, METERED RESPIRATORY (INHALATION) EVERY 4 HOURS PRN
Status: DISCONTINUED | OUTPATIENT
Start: 2023-12-23 | End: 2024-01-05 | Stop reason: HOSPADM

## 2023-12-23 RX ORDER — ACETAMINOPHEN 325 MG/1
650 TABLET ORAL EVERY 6 HOURS PRN
Status: DISCONTINUED | OUTPATIENT
Start: 2023-12-23 | End: 2024-01-05 | Stop reason: HOSPADM

## 2023-12-23 RX ORDER — FOLIC ACID 1 MG/1
1 TABLET ORAL DAILY
Status: DISCONTINUED | OUTPATIENT
Start: 2023-12-23 | End: 2024-01-03

## 2023-12-23 RX ORDER — ACETAMINOPHEN 650 MG/1
650 SUPPOSITORY RECTAL EVERY 6 HOURS PRN
Status: DISCONTINUED | OUTPATIENT
Start: 2023-12-23 | End: 2024-01-05 | Stop reason: HOSPADM

## 2023-12-23 RX ORDER — 0.9 % SODIUM CHLORIDE 0.9 %
500 INTRAVENOUS SOLUTION INTRAVENOUS ONCE
Status: COMPLETED | OUTPATIENT
Start: 2023-12-23 | End: 2023-12-23

## 2023-12-23 RX ORDER — ONDANSETRON 4 MG/1
4 TABLET, ORALLY DISINTEGRATING ORAL EVERY 8 HOURS PRN
Status: DISCONTINUED | OUTPATIENT
Start: 2023-12-23 | End: 2024-01-05 | Stop reason: HOSPADM

## 2023-12-23 RX ADMIN — Medication 10 MCG/MIN: at 17:26

## 2023-12-23 RX ADMIN — SODIUM CHLORIDE 500 ML: 9 INJECTION, SOLUTION INTRAVENOUS at 17:00

## 2023-12-23 NOTE — PROGRESS NOTES
Spiritual Care Assessment/Progress Note  [facility]    Name: Lizette Lynch MRN: [id number]    Age: 73 y.o.     Sex: female   Language: English     Date: 12/23/2023            Total Time Calculated: 13 min              Spiritual Assessment begun in [facility] EMERGENCY DEPT  Service Provided For:: Patient and family together  Referral/Consult From:: Nurse  Encounter Overview/Reason : Initial Encounter    Spiritual beliefs:      [x] Involved in a veronica tradition/spiritual practice: Episcopalian     [] Supported by a veronica community:      [] Claims no spiritual orientation:      [] Seeking spiritual identity:           [] Adheres to an individual form of spirituality:      [] Not able to assess:                Identified resources for coping and support system:   Support System: Family members       [x] Prayer                  [] Devotional reading               [] Music                  [] Guided Imagery     [] Pet visits                                        [] Other: (COMMENT)     Specific area/focus of visit   Encounter:    Crisis:    Spiritual/Emotional needs: Type: Spiritual Support  Ritual, Rites and Sacraments:    Grief, Loss, and Adjustments:    Ethics/Mediation:    Behavioral Health:    Palliative Care:    Advance Care Planning:           Narrative: Chaplain Georgina Del Toro rounded in the ED. Nursing leader requested for her to visit patient in ED-27. Patient was present. At the end of the visit, patient's  and daughter arrived. During the visit, patient shared about her medical condition. And she shared about her veronica and requested prayer.  provided patient with active listening, supportive presence, and prayer. Advised of  availability.     Rev. Georgina Del Toro DMin.    - Spiritual Health  [facility]

## 2023-12-23 NOTE — ED PROVIDER NOTES
[facility] EMERGENCY DEPT  EMERGENCY DEPARTMENT HISTORY AND PHYSICAL EXAM      Date: 2023  Patient Name: Lizette Lynch  MRN: [id number]  Birthdate 1950  Date of evaluation: 2023  Provider: Zeyad Townsend DO   Note Started: 3:21 PM EST 23    HISTORY OF PRESENT ILLNESS     Chief Complaint   Patient presents with    Fatigue     History Provided By: Patient    HPI: Lizette Lynch is a 73 y.o. female with a past medical history of COPD, anemia, renal carcinoma with widespread metastatic disease to bone, lung, and brain, end-stage renal disease on hemodialysis and hypertension who presents with fatigue.  States that symptoms started today.  She has had some mild shortness of breath as well.  She had dialysis yesterday.  Nephrologist is Dr. Estes.    Further history was obtained from the daughter.  Daughter states that her mother has been steadily declining.  She has been fatigued.  She was discharged yesterday from the hospital here and went to nursing home.  At the nursing home she was fatigued.  She was not drinking as much water.  The daughter did have discussion about hospice with the hospitalist  And is considering it.  The patient is considering hospice as well.    PAST MEDICAL HISTORY   Past Medical History:  Past Medical History:   Diagnosis Date    ESRD on hemodialysis (HCC)     Hypertension        Past Surgical History:  Past Surgical History:   Procedure Laterality Date     SECTION      IR INSERT TUNNELED PLEURA CATH W CUFF  2023    IR INSERT TUNNELED PLEURA CATH W CUFF 2023 Alice Palma PA [facility] RAD ANGIO IR    IR NONTUNNELED VASCULAR CATHETER  2023    IR NONTUNNELED VASCULAR CATHETER 2023 Alice Palma PA [facility] RAD ANGIO IR       Family History:  No family history on file.    Social History:  Social History     Tobacco Use    Smoking status: Former    Smokeless tobacco: Never   Substance Use Topics    Alcohol use: Never    Drug use: Never  (Medical): No     Lack of Transportation (Non-Medical): No   Physical Activity: Not on file   Stress: Not on file   Social Connections: Not on file   Intimate Partner Violence: Not on file   Depression: Not on file   Housing Stability: Low Risk  (11/29/2023)    Housing Stability Vital Sign     Unable to Pay for Housing in the Last Year: No     Number of Places Lived in the Last Year: 1     Unstable Housing in the Last Year: No   Interpersonal Safety: Not on file (11/29/2023)   Utilities: Not At Risk (11/29/2023)    [facility] Utilities     Threatened with loss of utilities: No     PHYSICAL EXAM   Constitutional:  Chronically ill-appearing.  Fatigued and tired appearing with her eyes closed.  Cardiac:  Regular rate and rhythm.  1+ radial pulses bilaterally.  Respiratory: No obvious respiratory distress.  Lung sounds are diminished bilaterally but present.  No wheezing.  Skin: No rash.  ENT: No rhinorrhea. Head is normocephalic and atraumatic.  Eye: No proptosis or conjunctival injections.  Gastrointestinal: Nondistended. Soft and nontender.  Musculoskeletal: No obvious bony deformities.  Psychiatric: Cooperative.  Appropriate mood and affect.    SCREENINGS                 LAB, EKG AND DIAGNOSTIC RESULTS   Labs:  Recent Results (from the past 12 hour(s))   CBC with Auto Differential    Collection Time: 12/23/23  3:31 PM   Result Value Ref Range    WBC 7.2 3.6 - 11.0 K/uL    RBC 2.76 (L) 3.80 - 5.20 M/uL    Hemoglobin 8.2 (L) 11.5 - 16.0 g/dL    Hematocrit 26.5 (L) 35.0 - 47.0 %    MCV 96.0 80.0 - 99.0 FL    MCH 29.7 26.0 - 34.0 PG    MCHC 30.9 30.0 - 36.5 g/dL    RDW 18.6 (H) 11.5 - 14.5 %    Platelets 172 150 - 400 K/uL    MPV 10.4 8.9 - 12.9 FL    Nucleated RBCs 0.3 (H) 0.0  WBC    nRBC 0.02 (H) 0.00 - 0.01 K/uL    Neutrophils % 74 32 - 75 %    Lymphocytes % 10 (L) 12 - 49 %    Monocytes % 9 5 - 13 %    Eosinophils % 6 0 - 7 %    Basophils % 0 0 - 1 %    Immature Granulocytes 1 (H) 0 - 0.5 %    Neutrophils Absolute

## 2023-12-24 LAB
25(OH)D3 SERPL-MCNC: 22.5 NG/ML (ref 30–100)
ALBUMIN SERPL-MCNC: 3 G/DL (ref 3.5–5)
ANION GAP SERPL CALC-SCNC: 11 MMOL/L (ref 5–15)
BASOPHILS # BLD: 0 K/UL (ref 0–0.1)
BASOPHILS NFR BLD: 1 % (ref 0–1)
BUN SERPL-MCNC: 27 MG/DL (ref 6–20)
BUN/CREAT SERPL: 7 (ref 12–20)
CA-I BLD-MCNC: 10.2 MG/DL (ref 8.5–10.1)
CA-I BLD-MCNC: 11.2 MG/DL (ref 8.5–10.1)
CHLORIDE SERPL-SCNC: 100 MMOL/L (ref 97–108)
CO2 SERPL-SCNC: 25 MMOL/L (ref 21–32)
CREAT SERPL-MCNC: 3.97 MG/DL (ref 0.55–1.02)
DIFFERENTIAL METHOD BLD: ABNORMAL
EKG ATRIAL RATE: 85 BPM
EKG DIAGNOSIS: NORMAL
EKG P AXIS: 76 DEGREES
EKG P-R INTERVAL: 164 MS
EKG Q-T INTERVAL: 340 MS
EKG QRS DURATION: 108 MS
EKG QTC CALCULATION (BAZETT): 404 MS
EKG R AXIS: 269 DEGREES
EKG T AXIS: 270 DEGREES
EKG VENTRICULAR RATE: 85 BPM
EOSINOPHIL # BLD: 0.7 K/UL (ref 0–0.4)
EOSINOPHIL NFR BLD: 8 % (ref 0–7)
ERYTHROCYTE [DISTWIDTH] IN BLOOD BY AUTOMATED COUNT: 18.3 % (ref 11.5–14.5)
GLUCOSE SERPL-MCNC: 101 MG/DL (ref 65–100)
HCT VFR BLD AUTO: 26.7 % (ref 35–47)
HGB BLD-MCNC: 8.2 G/DL (ref 11.5–16)
IMM GRANULOCYTES # BLD AUTO: 0.1 K/UL (ref 0–0.04)
IMM GRANULOCYTES NFR BLD AUTO: 1 % (ref 0–0.5)
LYMPHOCYTES # BLD: 0.7 K/UL (ref 0.8–3.5)
LYMPHOCYTES NFR BLD: 8 % (ref 12–49)
MCH RBC QN AUTO: 29.6 PG (ref 26–34)
MCHC RBC AUTO-ENTMCNC: 30.7 G/DL (ref 30–36.5)
MCV RBC AUTO: 96.4 FL (ref 80–99)
MONOCYTES # BLD: 0.8 K/UL (ref 0–1)
MONOCYTES NFR BLD: 9 % (ref 5–13)
NEUTS SEG # BLD: 6.5 K/UL (ref 1.8–8)
NEUTS SEG NFR BLD: 73 % (ref 32–75)
NRBC # BLD: 0 K/UL (ref 0–0.01)
NRBC BLD-RTO: 0 PER 100 WBC
PHOSPHATE SERPL-MCNC: 2.8 MG/DL (ref 2.6–4.7)
PLATELET # BLD AUTO: 190 K/UL (ref 150–400)
PMV BLD AUTO: 10.4 FL (ref 8.9–12.9)
POTASSIUM SERPL-SCNC: 4.4 MMOL/L (ref 3.5–5.1)
PTH-INTACT SERPL-MCNC: 63.7 PG/ML (ref 18.4–88)
RBC # BLD AUTO: 2.77 M/UL (ref 3.8–5.2)
SODIUM SERPL-SCNC: 136 MMOL/L (ref 136–145)
WBC # BLD AUTO: 8.8 K/UL (ref 3.6–11)

## 2023-12-24 PROCEDURE — 80069 RENAL FUNCTION PANEL: CPT

## 2023-12-24 PROCEDURE — 85025 COMPLETE CBC W/AUTO DIFF WBC: CPT

## 2023-12-24 PROCEDURE — 2700000000 HC OXYGEN THERAPY PER DAY

## 2023-12-24 PROCEDURE — 90935 HEMODIALYSIS ONE EVALUATION: CPT

## 2023-12-24 PROCEDURE — 94761 N-INVAS EAR/PLS OXIMETRY MLT: CPT

## 2023-12-24 PROCEDURE — 2000000000 HC ICU R&B

## 2023-12-24 PROCEDURE — 6370000000 HC RX 637 (ALT 250 FOR IP): Performed by: HOSPITALIST

## 2023-12-24 PROCEDURE — 2709999900 HC NON-CHARGEABLE SUPPLY

## 2023-12-24 PROCEDURE — 2580000003 HC RX 258: Performed by: HOSPITALIST

## 2023-12-24 PROCEDURE — 36415 COLL VENOUS BLD VENIPUNCTURE: CPT

## 2023-12-24 PROCEDURE — 83970 ASSAY OF PARATHORMONE: CPT

## 2023-12-24 PROCEDURE — 5A1D70Z PERFORMANCE OF URINARY FILTRATION, INTERMITTENT, LESS THAN 6 HOURS PER DAY: ICD-10-PCS | Performed by: INTERNAL MEDICINE

## 2023-12-24 PROCEDURE — 2500000003 HC RX 250 WO HCPCS: Performed by: EMERGENCY MEDICINE

## 2023-12-24 PROCEDURE — 6360000002 HC RX W HCPCS: Performed by: INTERNAL MEDICINE

## 2023-12-24 PROCEDURE — 6370000000 HC RX 637 (ALT 250 FOR IP): Performed by: INTERNAL MEDICINE

## 2023-12-24 PROCEDURE — 82306 VITAMIN D 25 HYDROXY: CPT

## 2023-12-24 RX ORDER — CHOLECALCIFEROL (VITAMIN D3) 125 MCG
5 CAPSULE ORAL NIGHTLY PRN
Status: DISCONTINUED | OUTPATIENT
Start: 2023-12-24 | End: 2024-01-05 | Stop reason: HOSPADM

## 2023-12-24 RX ORDER — HEPARIN SODIUM 1000 [USP'U]/ML
3600 INJECTION, SOLUTION INTRAVENOUS; SUBCUTANEOUS PRN
Status: DISCONTINUED | OUTPATIENT
Start: 2023-12-24 | End: 2024-01-05 | Stop reason: HOSPADM

## 2023-12-24 RX ADMIN — Medication 5 MG: at 23:35

## 2023-12-24 RX ADMIN — HEPARIN SODIUM 3600 UNITS: 1000 INJECTION INTRAVENOUS; SUBCUTANEOUS at 17:20

## 2023-12-24 RX ADMIN — Medication 1 LOZENGE: at 20:08

## 2023-12-24 RX ADMIN — Medication 10 MCG/MIN: at 06:31

## 2023-12-24 RX ADMIN — FOLIC ACID 1 MG: 1 TABLET ORAL at 09:57

## 2023-12-24 RX ADMIN — ASPIRIN 81 MG: 81 TABLET, COATED ORAL at 09:57

## 2023-12-24 RX ADMIN — ACETAMINOPHEN 650 MG: 325 TABLET, FILM COATED ORAL at 23:30

## 2023-12-24 RX ADMIN — SODIUM CHLORIDE, PRESERVATIVE FREE 10 ML: 5 INJECTION INTRAVENOUS at 20:59

## 2023-12-24 RX ADMIN — Medication 8 MCG/MIN: at 06:24

## 2023-12-24 RX ADMIN — MIDODRINE HYDROCHLORIDE 10 MG: 5 TABLET ORAL at 09:57

## 2023-12-24 RX ADMIN — MIDODRINE HYDROCHLORIDE 10 MG: 5 TABLET ORAL at 20:58

## 2023-12-24 RX ADMIN — Medication 1 LOZENGE: at 12:58

## 2023-12-24 RX ADMIN — GUAIFENESIN 600 MG: 600 TABLET, EXTENDED RELEASE ORAL at 09:57

## 2023-12-24 RX ADMIN — PANTOPRAZOLE SODIUM 20 MG: 20 TABLET, DELAYED RELEASE ORAL at 09:57

## 2023-12-24 RX ADMIN — GUAIFENESIN 600 MG: 600 TABLET, EXTENDED RELEASE ORAL at 20:58

## 2023-12-24 ASSESSMENT — PAIN SCALES - GENERAL
PAINLEVEL_OUTOF10: 0

## 2023-12-24 ASSESSMENT — LIFESTYLE VARIABLES
HOW MANY STANDARD DRINKS CONTAINING ALCOHOL DO YOU HAVE ON A TYPICAL DAY: PATIENT DOES NOT DRINK
HOW OFTEN DO YOU HAVE A DRINK CONTAINING ALCOHOL: NEVER
HOW OFTEN DO YOU HAVE A DRINK CONTAINING ALCOHOL: NEVER

## 2023-12-24 ASSESSMENT — PAIN - FUNCTIONAL ASSESSMENT: PAIN_FUNCTIONAL_ASSESSMENT: NONE - DENIES PAIN

## 2023-12-24 NOTE — ED NOTES
Levophed drip started back prior to this nurse assuming care. See MAR for details. Upgrading to ICU admit.

## 2023-12-24 NOTE — PROGRESS NOTES
Hospitalist Progress Note               Daily Progress Note: 12/24/2023      Chief complaint:   Chief Complaint   Patient presents with    Fatigue        Subjective:   Hospital course to date:    73-year-old female with widespread metastatic lung cancer with mets to mediastinum, bone, lung and brain, ESRD, right pleural effusion with Aspira drain in place.  She had a recent pathologic left numerous fracture requiring ORIF.  Patient was recently admitted on 11/28 with worsening shortness of breath.  Chest x-ray showed complete opacification of the left hemithorax.  She underwent placement of an Aspira catheter on the left.  She went back and forth to the ICU several times.  She was treated with Zosyn.  Several discussions were held with daughter regarding hospice but her daughter is holding out hope that patient's condition and performance status will improve enough for her to receive immunotherapy.  Her daughter requested SNF and she was discharged to a facility on 12/22.    She returned yesterday within 24 hours due to lethargy and hypotension.  Blood pressures were as low as 60s systolic in the ED.  I initially spoke with the daughter in the ED, outlined grim prognosis and the fact that her mother was probably actively dying.  She did not want to hear this and demanded transfer to [facility].  Attempts were made to accomplish this but she was declined for transfer.  Patient then was admitted to the ICU and started on pressors.  She did agree to DNR CODE STATUS      --------  Patient is seen today for follow-up.  Patient is awake and alert.  She is on norepinephrine at 12 mcg currently.  Blood pressure currently 140 systolic      Medications reviewed  Current Facility-Administered Medications   Medication Dose Route Frequency    norepinephrine (LEVOPHED) 16 mg in sodium chloride 0.9 % 250 mL infusion  1-100 mcg/min IntraVENous Continuous    albuterol sulfate HFA (PROVENTIL;VENTOLIN;PROAIR) 108 (90 Base) MCG/ACT

## 2023-12-24 NOTE — DIALYSIS
Patient tolerated treatment. 500 ml of fluid was removed. Patient was alert and oriented during treatment. 41.2 liters of blood was processed. Report was given to primary nurse Rose.

## 2023-12-24 NOTE — PROGRESS NOTES
[facility] K[facility]NEY     Renal Daily Progress Note:     Subjective: Patient, chest bronchitis largely resolved postthoracentesis.  However, still dyspnea exertion.  Appetite is improved with not eating much.  No nausea or vomiting.    F/U - ESRD --> 12/1/23    No new complaints were offered.  Had all but approx 45 minutes of HD today.   The system clotted. Her Dialysis Nurses attempted to restart HD. However, no bruit was appreciated (in the AV shunt).  A consult was placed for IR (for declot).    12/2-feels ok, no complaints, Bps soft. Had HD yesterday 880 ml UF-on 2 lit O2- at bedside    12/3-transferred to ICU for hypotension, given IVF and albumin IV. On Levophed, SOB this am, Cxr with patchy ASD and increased pleural effusion. Hb low 6.2, receiving 1 U PRBC    12-4 events of weekend noted, 2 hr UF yesterday, no longer SOB, no pain, major complaint is fatigue. No appetite    12-5 seen on dialysis, off pressors, SBP stable at 120 mmHg. Attempting 1.5 liter UF. Awake, alert but fatigued, no appetite, no N/V. No abd or chest pain    12-6 awake, fatigued, poor appetite, not SOB, no chest or abd pain, Left arm remains swollen    12-7 seen occlusion of dialysis.  Tolerated treatment well.  Systolic blood pressure 130 on minimal Levophed.  Not eating much, does not like the food texture and or taste.  Not short of breath, no chest or abdominal pain.    12-8 sitting up in bed.  Feels well.  Remains on Levophed despite systolic blood pressure 172.  No shortness of breath or chest pain.  No nausea or vomiting.  Eating more since diet and food textures were adjusted      12/9/23 --> F/U ESRD     No new complaints were offered.  HD is due today.  Ms Lynch rested well.  She likes oatmeal.      12/10/23 --> F/U ESRD      Had HD yesterday with removal of 1.5 litres of volume.    12-11 fatigued, not eating, no N/V, no abd or chest pain. Dialysis today to get back on schedule    12-12 sitting up in bed, would like to get to

## 2023-12-24 NOTE — ED NOTES
Assumed care of patient at 2330. Patient resting comfortably on stretcher. No complaints at this time.

## 2023-12-24 NOTE — CARE COORDINATION
12/24/23 1137   Service Assessment   Patient Orientation Alert and Oriented   Cognition Alert   History Provided By Patient   Primary Caregiver Self   Accompanied By/Relationship Pt alone during interview.   Support Systems Spouse/Significant Other;Children   Patient's Healthcare Decision Maker is: Legal Next of Kin   PCP Verified by CM Yes  (Landon Gautam - seen @ the facility.)   Last Visit to PCP Within last 3 months   Prior Functional Level Assistance with the following:;Bathing;Dressing;Toileting;Cooking;Housework;Shopping;Mobility  (Uses walker.)   Current Functional Level Assistance with the following:;Bathing;Dressing;Toileting;Cooking;Housework;Shopping;Mobility  (Uses walker.)   Can patient return to prior living arrangement Yes   Ability to make needs known: Good   Family able to assist with home care needs: Yes   Would you like for me to discuss the discharge plan with any other family members/significant others, and if so, who? Yes  ( Teja Lynch & daughter Sayra.)   Financial Resources Medicare   Community Resources None   Social/Functional History   Lives With Other (comment)  (Facility)   Type of Home Facility  (Pt signed Choice Letter to return to [facility]/Referral.)   Home Layout One level   Home Access Level entry   Bathroom Shower/Tub Walk-in shower   Bathroom Toilet Handicap height   Bathroom Equipment Grab bars around toilet;Grab bars in shower   Bathroom Accessibility Wheelchair accessible   Home Equipment Walker, standard   Receives Help From Family;Other (comment)  (Facility staff.)   ADL Assistance Needs assistance   Toileting Needs assistance   Homemaking Assistance Needs assistance   Homemaking Responsibilities Yes   Ambulation Assistance Needs assistance  (Uses walker.)   Transfer Assistance Needs assistance   Active  No   Occupation Retired   Discharge Planning   Type of Residence Skilled Nursing Facility   Living Arrangements Other (Comment)   Current Services  Prior To Admission Skilled Nursing Facility;Transportation;Other (Comment)  ([facility] MWF)   Potential Assistance Needed Skilled Nursing Facility;Transportation   DME Ordered? No   Potential Assistance Purchasing Medications No   Type of Home Care Services None   Patient expects to be discharged to: Skilled nursing facility   One/Two Story Residence One story   History of falls? 0   Services At/After Discharge   Transition of Care Consult (CM Consult) Discharge Planning;Transportation Assistance   Services At/After Discharge Transport;Skilled Nursing Facility (SNF)    Resource Information Provided? No   Mode of Transport at Discharge BLS   Confirm Follow Up Transport Other (see comment)     Readmission Assessment  Number of Days since last admission?: 8-30 days  Previous Disposition: SNF  Who is being Interviewed: Patient  What was the patient's/caregiver's perception as to why they think they needed to return back to the hospital?: Other (Comment) (Weakness.)  Did you visit your Primary Care Physician after you left the hospital, before you returned this time?: Yes (Seen @ the facility.)  Did you see a specialist, such as Cardiac, Pulmonary, Orthopedic Physician, etc. after you left the hospital?: No  Who advised the patient to return to the hospital?: Other (Comment) (SNF)  Does the patient report anything that got in the way of taking their medications?: No  In our efforts to provide the best possible care to you and others like you, can you think of anything that we could have done to help you after you left the hospital the first time, so that you might not have needed to return so soon?: Other (Comment) (No suggestions.)    CM met with pt & D/C Plan is to return to [facility] via transport upon discharge. Pt signed Choice Letter to return to [facility]-referral [facility]. Uses [facility]. [facility] MWF .

## 2023-12-24 NOTE — PLAN OF CARE
Problem: Coping  Goal: Pt/Family able to verbalize concerns and demonstrate effective coping strategies  Description: INTERVENTIONS:  1. Assist patient/family to identify coping skills, available support systems and cultural and spiritual values  2. Provide emotional support, including active listening and acknowledgement of concerns of patient and caregivers  3. Reduce environmental stimuli, as able  4. Instruct patient/family in relaxation techniques, as appropriate  5. Assess for spiritual pain/suffering and initiate Spiritual Care, Psychosocial Clinical Specialist consults as needed  Outcome: Not Progressing  Flowsheets (Taken 12/24/2023 0400 by El Luevano, RN)  Patient/family able to verbalize anxieties, fears, and concerns, and demonstrate effective coping: Assist patient/family to identify coping skills, available support systems and cultural and spiritual values

## 2023-12-24 NOTE — H&P
Hospitalist History & Physical Notes.           [facility].              Name : Lizette Lynch      MRN number : [id number]     YOB: 1950     Subjective :   Chief Complaint : Generalized weakness, hypotension    Source of information : Mostly from the emergency room physician.  Patient family just left outside.  Reviewed the recent admission records.    History of present illness:   Lizette Lynch is  73 y.o. female recently few months ago diagnosed with renal cell carcinoma with metastasis to multiple organs including pathological left humerus fracture that was surgically corrected and metastasis to brain treated with radiation.  She is diagnosed with just few months ago, being followed at [facility].  But due to hospitalizations and rehab placements unable to start on treatment.  Was admitted on November 28 -diagnosed with the recurrent pleural effusion that was treated with Aspira catheter with drainage, continued on hemodialysis.  Initially required pressors due to significant hypotension with shock.  It was a prolonged hospitalization just discharged yesterday to skilled nursing facility at [facility] and rehab.    She is brought from the nursing home complaining of hypotension, arrival to the emergency room she was hypotensive started on Levophed with improvement.  She is already started on midodrine due to hypotension.  As she is improved will try to wean off but unable to so admitted to ICU.    Before discharge primary team discussed with patient family about hospice care as there is no improvement in her condition and prognosis still remains very poor.  But family was wondering if she can get more strength and get the treatment.  But she is a DO NOT RESUSCITATE at the time of discharge.      Past Medical History:   Diagnosis Date    ESRD on hemodialysis (HCC)     Hypertension    Renal cell carcinoma with metastasis  Malignant pleural effusion  COPD anemia of malignancy  still not accepting.    Admitted to ICU.      Medications Home :    Reviewed    Code status : Full code    VTE prophylaxis : None due to risk of bleeding    Advance Medical directive : Health care decision maker information is on file.         Discussion/MDM:   I have discussed patient's presentation/findings and clinical course to date with ED provider.     Given the patient's current clinical presentation, I have a high level of concern for decompensation if discharged from the emergency department as patient has multiple medical comorbidities with increased risk of morbidity and mortality  that warrants admission to hospital.     I have reviewed patient's presenting subjective and objective findings, as well as all laboratory studies, imaging studies, and vital signs to date as well as treatment rendered and patient's response to those treatments.  In addition, prior medical, surgical and relevant social and family histories were reviewed.      CC : Keerthi Metzger MD  Signed By: Griffin Sosa MD     December 23, 2023      This dictation was done by dragon, computer voice recognition software.  Often unanticipated grammatical, syntax, Garner phones and other interpretive errors are inadvertently transcribed.  Please excuse errors that have escaped final proofreading.

## 2023-12-24 NOTE — ED NOTES
Tech and nurse placed patient on hospital bed for comfort. Put patient in hospital gown and wiped her down with warm wipes. Tech applied lotion and lip balm for patient's comfort.

## 2023-12-25 LAB
ALBUMIN SERPL-MCNC: 2.8 G/DL (ref 3.5–5)
ANION GAP SERPL CALC-SCNC: 7 MMOL/L (ref 5–15)
BUN SERPL-MCNC: 18 MG/DL (ref 6–20)
BUN/CREAT SERPL: 6 (ref 12–20)
CA-I BLD-MCNC: 9.8 MG/DL (ref 8.5–10.1)
CHLORIDE SERPL-SCNC: 100 MMOL/L (ref 97–108)
CO2 SERPL-SCNC: 27 MMOL/L (ref 21–32)
CREAT SERPL-MCNC: 3.1 MG/DL (ref 0.55–1.02)
GLUCOSE SERPL-MCNC: 111 MG/DL (ref 65–100)
PHOSPHATE SERPL-MCNC: 1.9 MG/DL (ref 2.6–4.7)
POTASSIUM SERPL-SCNC: 3.8 MMOL/L (ref 3.5–5.1)
SODIUM SERPL-SCNC: 134 MMOL/L (ref 136–145)

## 2023-12-25 PROCEDURE — 6370000000 HC RX 637 (ALT 250 FOR IP): Performed by: HOSPITALIST

## 2023-12-25 PROCEDURE — 2700000000 HC OXYGEN THERAPY PER DAY

## 2023-12-25 PROCEDURE — 2000000000 HC ICU R&B

## 2023-12-25 PROCEDURE — 80069 RENAL FUNCTION PANEL: CPT

## 2023-12-25 PROCEDURE — 6370000000 HC RX 637 (ALT 250 FOR IP): Performed by: INTERNAL MEDICINE

## 2023-12-25 PROCEDURE — 2500000003 HC RX 250 WO HCPCS: Performed by: EMERGENCY MEDICINE

## 2023-12-25 PROCEDURE — 94761 N-INVAS EAR/PLS OXIMETRY MLT: CPT

## 2023-12-25 PROCEDURE — 2580000003 HC RX 258: Performed by: HOSPITALIST

## 2023-12-25 PROCEDURE — 36415 COLL VENOUS BLD VENIPUNCTURE: CPT

## 2023-12-25 RX ORDER — SORBITOL SOLUTION 70 %
15 SOLUTION, ORAL MISCELLANEOUS DAILY
Status: DISPENSED | OUTPATIENT
Start: 2023-12-25 | End: 2023-12-28

## 2023-12-25 RX ORDER — MIDODRINE HYDROCHLORIDE 5 MG/1
15 TABLET ORAL
Status: DISCONTINUED | OUTPATIENT
Start: 2023-12-25 | End: 2023-12-25

## 2023-12-25 RX ORDER — MIDODRINE HYDROCHLORIDE 5 MG/1
15 TABLET ORAL
Status: DISCONTINUED | OUTPATIENT
Start: 2023-12-25 | End: 2023-12-27

## 2023-12-25 RX ORDER — DIBUCAINE 0.28 G/28G
OINTMENT TOPICAL 3 TIMES DAILY PRN
Status: DISCONTINUED | OUTPATIENT
Start: 2023-12-25 | End: 2024-01-05 | Stop reason: HOSPADM

## 2023-12-25 RX ADMIN — SORBITOL SOLUTION (BULK) 15 ML: 70 SOLUTION at 18:14

## 2023-12-25 RX ADMIN — PANTOPRAZOLE SODIUM 20 MG: 20 TABLET, DELAYED RELEASE ORAL at 10:35

## 2023-12-25 RX ADMIN — SODIUM CHLORIDE, PRESERVATIVE FREE 10 ML: 5 INJECTION INTRAVENOUS at 21:38

## 2023-12-25 RX ADMIN — Medication 3 MCG/MIN: at 21:37

## 2023-12-25 RX ADMIN — ASPIRIN 81 MG: 81 TABLET, COATED ORAL at 10:34

## 2023-12-25 RX ADMIN — ACETAMINOPHEN 650 MG: 325 TABLET, FILM COATED ORAL at 15:48

## 2023-12-25 RX ADMIN — GUAIFENESIN 600 MG: 600 TABLET, EXTENDED RELEASE ORAL at 21:08

## 2023-12-25 RX ADMIN — FOLIC ACID 1 MG: 1 TABLET ORAL at 10:35

## 2023-12-25 RX ADMIN — MIDODRINE HYDROCHLORIDE 15 MG: 5 TABLET ORAL at 10:34

## 2023-12-25 RX ADMIN — GUAIFENESIN 600 MG: 600 TABLET, EXTENDED RELEASE ORAL at 10:34

## 2023-12-25 RX ADMIN — MIDODRINE HYDROCHLORIDE 15 MG: 5 TABLET ORAL at 18:14

## 2023-12-25 RX ADMIN — Medication 10 MCG/MIN: at 10:41

## 2023-12-25 ASSESSMENT — PAIN SCALES - GENERAL
PAINLEVEL_OUTOF10: 0

## 2023-12-25 NOTE — PROGRESS NOTES
[facility] KIDNEY     Renal Daily Progress Note:     Subjective: Patient, chest bronchitis largely resolved postthoracentesis.  However, still dyspnea exertion.  Appetite is improved with not eating much.  No nausea or vomiting.    F/U - ESRD --> 12/1/23    No new complaints were offered.  Had all but approx 45 minutes of HD today.   The system clotted. Her Dialysis Nurses attempted to restart HD. However, no bruit was appreciated (in the AV shunt).  A consult was placed for IR (for declot).    12/2-feels ok, no complaints, Bps soft. Had HD yesterday 880 ml UF-on 2 lit O2- at bedside    12/3-transferred to ICU for hypotension, given IVF and albumin IV. On Levophed, SOB this am, Cxr with patchy ASD and increased pleural effusion. Hb low 6.2, receiving 1 U PRBC    12-4 events of weekend noted, 2 hr UF yesterday, no longer SOB, no pain, major complaint is fatigue. No appetite    12-5 seen on dialysis, off pressors, SBP stable at 120 mmHg. Attempting 1.5 liter UF. Awake, alert but fatigued, no appetite, no N/V. No abd or chest pain    12-6 awake, fatigued, poor appetite, not SOB, no chest or abd pain, Left arm remains swollen    12-7 seen occlusion of dialysis.  Tolerated treatment well.  Systolic blood pressure 130 on minimal Levophed.  Not eating much, does not like the food texture and or taste.  Not short of breath, no chest or abdominal pain.    12-8 sitting up in bed.  Feels well.  Remains on Levophed despite systolic blood pressure 172.  No shortness of breath or chest pain.  No nausea or vomiting.  Eating more since diet and food textures were adjusted      12/9/23 --> F/U ESRD     No new complaints were offered.  HD is due today.  Ms Lynch rested well.  She likes oatmeal.      12/10/23 --> F/U ESRD      Had HD yesterday with removal of 1.5 litres of volume.    12-11 fatigued, not eating, no N/V, no abd or chest pain. Dialysis today to get back on schedule    12-12 sitting up in bed, would like to get to

## 2023-12-25 NOTE — PLAN OF CARE
Problem: Safety - Adult  Goal: Free from fall injury  12/24/2023 1947 by Nathalie Nava, RN  Outcome: Progressing  12/24/2023 1226 by Rose Ornelas RN  Outcome: Progressing     Problem: Coping  Goal: Pt/Family able to verbalize concerns and demonstrate effective coping strategies  Description: INTERVENTIONS:  1. Assist patient/family to identify coping skills, available support systems and cultural and spiritual values  2. Provide emotional support, including active listening and acknowledgement of concerns of patient and caregivers  3. Reduce environmental stimuli, as able  4. Instruct patient/family in relaxation techniques, as appropriate  5. Assess for spiritual pain/suffering and initiate Spiritual Care, Psychosocial Clinical Specialist consults as needed  12/24/2023 1947 by Nathalie Nava RN  Outcome: Progressing  12/24/2023 1226 by Rose Ornelas, RN  Outcome: Not Progressing  Flowsheets (Taken 12/24/2023 0400 by El Luevano, RN)  Patient/family able to verbalize anxieties, fears, and concerns, and demonstrate effective coping: Assist patient/family to identify coping skills, available support systems and cultural and spiritual values     Problem: Death & Dying  Goal: Pt/Family communicate acceptance of impending death and feel psychological comfort and peace  Description: INTERVENTIONS:  1. Assess patient/family anxiety and grief process related to end of life issues  2. Provide emotional and spiritual support  3. Provide information about the patient's health status with consideration of family and cultural values  4. Communicate willingness to discuss death and facilitate grief process  with patient/family as appropriate  5. Emphasize sustaining relationships within family system and community, or veronica/spiritual traditions  6. Initiate Spiritual Care, Psychosocial Clinical Specialist, consult as needed  Outcome: Progressing     Problem: Decision Making  Goal: Pt/Family able to effectively

## 2023-12-25 NOTE — PROGRESS NOTES
Hospitalist Progress Note               Daily Progress Note: 12/25/2023      Chief complaint:   Chief Complaint   Patient presents with    Fatigue        Subjective:   Hospital course to date:    73-year-old female with widespread metastatic lung cancer with mets to mediastinum, bone, lung and brain, ESRD, right pleural effusion with Aspira drain in place.  She had a recent pathologic left numerous fracture requiring ORIF.  Patient was recently admitted on 11/28 with worsening shortness of breath.  Chest x-ray showed complete opacification of the left hemithorax.  She underwent placement of an Aspira catheter on the left.  She went back and forth to the ICU several times.  She was treated with Zosyn.  Several discussions were held with daughter regarding hospice but her daughter is holding out hope that patient's condition and performance status will improve enough for her to receive immunotherapy.  Her daughter requested SNF and she was discharged to a facility on 12/22.    She returned yesterday within 24 hours due to lethargy and hypotension.  Blood pressures were as low as 60s systolic in the ED.  I initially spoke with the daughter in the ED, outlined grim prognosis and the fact that her mother was probably actively dying.  She did not want to hear this and demanded transfer to [facility].  Attempts were made to accomplish this but she was declined for transfer.  Patient then was admitted to the ICU and started on pressors.  She did agree to DNR CODE STATUS    Patient was admitted to the ICU, started on norepinephrine.      --------  Patient is seen today for follow-up.  She is awake and alert.  She continues on norepinephrine at 10 mcg.      Medications reviewed  Current Facility-Administered Medications   Medication Dose Route Frequency    Benzocaine-Menthol (CEPACOL) 1 lozenge  1 lozenge Oral Q2H PRN    heparin (porcine) injection 3,600 Units  3,600 Units IntraCATHeter PRN    melatonin tablet 5 mg  5 mg Oral  extremities.   Skin: Skin color, texture, turgor normal. No rashes or lesions   Neurologic: CNII-XII intact.  No gross focal deficits         Data Review:       Recent Days:  Recent Labs     12/23/23  1531 12/24/23  0620   WBC 7.2 8.8   HGB 8.2* 8.2*   HCT 26.5* 26.7*    190       Recent Labs     12/23/23  1531 12/24/23  0620 12/25/23  0243   * 136 134*   K 4.2 4.4 3.8   CL 98 100 100   CO2 28 25 27   GLUCOSE 91 101* 111*   BUN 22* 27* 18   CREATININE 3.52* 3.97* 3.10*   CALCIUM 10.2* 10.2*  11.2* 9.8   MG 2.1  --   --    PHOS  --  2.8 1.9*   LABALBU 3.0* 3.0* 2.8*   BILITOT 0.9  --   --    AST 23  --   --    ALT 7*  --   --        No results for input(s): \"PHART\", \"VQC7XKI\", \"PO2ART\", \"GMG7MPT\", \"BEART\", \"UUF3HSIG\", \"HGBART\", \"WY7LXBAGW\", \"FIO2A\", \"F3BLWTLH\", \"OXYHEM\", \"CARBOXHGBART\", \"METHGBART\", \"Z1AZGTBJQ\", \"PHCORART\", \"TEMP\" in the last 72 hours.    Invalid input(s): \"JOC0OZMYC\"    24 Hour Results:  Recent Results (from the past 24 hour(s))   Renal Function Panel    Collection Time: 12/25/23  2:43 AM   Result Value Ref Range    Sodium 134 (L) 136 - 145 mmol/L    Potassium 3.8 3.5 - 5.1 mmol/L    Chloride 100 97 - 108 mmol/L    CO2 27 21 - 32 mmol/L    Anion Gap 7 5 - 15 mmol/L    Glucose 111 (H) 65 - 100 mg/dL    BUN 18 6 - 20 mg/dL    Creatinine 3.10 (H) 0.55 - 1.02 mg/dL    Bun/Cre Ratio 6 (L) 12 - 20      Est, Glom Filt Rate 15 (L) >60 ml/min/1.73m2    Calcium 9.8 8.5 - 10.1 mg/dL    Phosphorus 1.9 (L) 2.6 - 4.7 mg/dL    Albumin 2.8 (L) 3.5 - 5.0 g/dL         XR CHEST PORTABLE   Final Result      No significant change                Discussion/MDM:   Patient with multiple medical comorbidities, each with high likelihood for morbidity and mortality if left untreated.   I have reviewed patient's presenting subjective and objective findings, as well as all laboratory studies, imaging studies, and vital signs to date as well as treatment rendered and patient's response to those treatments.      In

## 2023-12-26 LAB
ALBUMIN SERPL-MCNC: 2.7 G/DL (ref 3.5–5)
ANION GAP SERPL CALC-SCNC: 11 MMOL/L (ref 5–15)
BUN SERPL-MCNC: 31 MG/DL (ref 6–20)
BUN/CREAT SERPL: 8 (ref 12–20)
CA-I BLD-MCNC: 10.7 MG/DL (ref 8.5–10.1)
CHLORIDE SERPL-SCNC: 100 MMOL/L (ref 97–108)
CO2 SERPL-SCNC: 25 MMOL/L (ref 21–32)
CREAT SERPL-MCNC: 3.96 MG/DL (ref 0.55–1.02)
GLUCOSE SERPL-MCNC: 92 MG/DL (ref 65–100)
PHOSPHATE SERPL-MCNC: 2.7 MG/DL (ref 2.6–4.7)
POTASSIUM SERPL-SCNC: 4 MMOL/L (ref 3.5–5.1)
SODIUM SERPL-SCNC: 136 MMOL/L (ref 136–145)

## 2023-12-26 PROCEDURE — 2000000000 HC ICU R&B

## 2023-12-26 PROCEDURE — 36415 COLL VENOUS BLD VENIPUNCTURE: CPT

## 2023-12-26 PROCEDURE — 80069 RENAL FUNCTION PANEL: CPT

## 2023-12-26 PROCEDURE — 6370000000 HC RX 637 (ALT 250 FOR IP): Performed by: INTERNAL MEDICINE

## 2023-12-26 PROCEDURE — 6370000000 HC RX 637 (ALT 250 FOR IP): Performed by: HOSPITALIST

## 2023-12-26 PROCEDURE — 2580000003 HC RX 258: Performed by: HOSPITALIST

## 2023-12-26 PROCEDURE — 2700000000 HC OXYGEN THERAPY PER DAY

## 2023-12-26 RX ADMIN — SORBITOL SOLUTION (BULK) 15 ML: 70 SOLUTION at 08:36

## 2023-12-26 RX ADMIN — MIDODRINE HYDROCHLORIDE 15 MG: 5 TABLET ORAL at 17:26

## 2023-12-26 RX ADMIN — SODIUM CHLORIDE, PRESERVATIVE FREE 10 ML: 5 INJECTION INTRAVENOUS at 08:37

## 2023-12-26 RX ADMIN — MIDODRINE HYDROCHLORIDE 15 MG: 5 TABLET ORAL at 12:03

## 2023-12-26 RX ADMIN — ACETAMINOPHEN 650 MG: 325 TABLET, FILM COATED ORAL at 02:30

## 2023-12-26 RX ADMIN — PANTOPRAZOLE SODIUM 20 MG: 20 TABLET, DELAYED RELEASE ORAL at 08:37

## 2023-12-26 RX ADMIN — FOLIC ACID 1 MG: 1 TABLET ORAL at 08:37

## 2023-12-26 RX ADMIN — GUAIFENESIN 600 MG: 600 TABLET, EXTENDED RELEASE ORAL at 08:37

## 2023-12-26 RX ADMIN — GUAIFENESIN 600 MG: 600 TABLET, EXTENDED RELEASE ORAL at 21:23

## 2023-12-26 RX ADMIN — SODIUM CHLORIDE, PRESERVATIVE FREE 10 ML: 5 INJECTION INTRAVENOUS at 21:23

## 2023-12-26 RX ADMIN — ASPIRIN 81 MG: 81 TABLET, COATED ORAL at 08:37

## 2023-12-26 RX ADMIN — MIDODRINE HYDROCHLORIDE 15 MG: 5 TABLET ORAL at 08:37

## 2023-12-26 ASSESSMENT — PAIN SCALES - GENERAL
PAINLEVEL_OUTOF10: 0

## 2023-12-26 NOTE — PROGRESS NOTES
[facility]IDNEY     Renal Daily Progress Note:     Subjective: Patient, chest bronchitis largely resolved postthoracentesis.  However, still dyspnea exertion.  Appetite is improved with not eating much.  No nausea or vomiting.         tolerated dialysis but unable to remove any volume because of hypotension.  Patient seen postdialysis around 6 PM.  Daughter at the bedside.  She is not eating much.  However, still awake and alert.  Not short of breath, no nausea or vomiting, pain is controlled     last dialysis was  and then patient was discharged to a nursing home.  Subsequently returned the next day due to dyspnea per patient's ex-.  However, she seems comfortable today.  She was initially hypotensive with systolic blood pressure 75-80 but was placed on Levophed.  Systolic blood pressure around 100.  She is awake and alert.  Appears comfortable.  Remains hypercalcemic, otherwise labs are okay.  Chest x-ray shows persistent left pleural effusion     awake and alert, pleasant, no distress,  at bedside.  She is complaining perirectal pain when having a bowel movement.  Also is constipated.  No shortness of breath or chest pain.  Next dialysis will be Wednesday.    -feels ok, has diarrhea since yesterday after sorbitol, low appetite. Daughter at bedside    Review of Systems  Pertinent items are noted in HPI.    Objective:     BP (!) 120/38   Pulse 91   Temp 98.7 °F (37.1 °C) (Axillary)   Resp 29   Ht 1.524 m (5')   Wt 56.7 kg (125 lb)   SpO2 95%   BMI 24.41 kg/m²   Temp (24hrs), Av.9 °F (37.2 °C), Min:98.7 °F (37.1 °C), Max:99.1 °F (37.3 °C)        Intake/Output Summary (Last 24 hours) at 2023 1435  Last data filed at 2023 0700  Gross per 24 hour   Intake 150.52 ml   Output --   Net 150.52 ml           Current Facility-Administered Medications   Medication Dose Route Frequency    midodrine (PROAMATINE) tablet 15 mg  15 mg Oral TID WC    sorbitol  70 % liquid 15 mL  15 mL Oral Daily    dibucaine (NUPERCAINAL) 1 % ointment   Topical TID PRN    Benzocaine-Menthol (CEPACOL) 1 lozenge  1 lozenge Oral Q2H PRN    heparin (porcine) injection 3,600 Units  3,600 Units IntraCATHeter PRN    melatonin tablet 5 mg  5 mg Oral Nightly PRN    norepinephrine (LEVOPHED) 16 mg in sodium chloride 0.9 % 250 mL infusion  1-100 mcg/min IntraVENous Continuous    albuterol sulfate HFA (PROVENTIL;VENTOLIN;PROAIR) 108 (90 Base) MCG/ACT inhaler 2 puff  2 puff Inhalation Q4H PRN    aspirin EC tablet 81 mg  81 mg Oral Daily    guaiFENesin (MUCINEX) extended release tablet 600 mg  600 mg Oral BID    pantoprazole (PROTONIX) tablet 20 mg  20 mg Oral Daily    folic acid (FOLVITE) tablet 1 mg  1 mg Oral Daily    sodium chloride flush 0.9 % injection 5-40 mL  5-40 mL IntraVENous 2 times per day    sodium chloride flush 0.9 % injection 5-40 mL  5-40 mL IntraVENous PRN    0.9 % sodium chloride infusion   IntraVENous PRN    ondansetron (ZOFRAN-ODT) disintegrating tablet 4 mg  4 mg Oral Q8H PRN    Or    ondansetron (ZOFRAN) injection 4 mg  4 mg IntraVENous Q6H PRN    acetaminophen (TYLENOL) tablet 650 mg  650 mg Oral Q6H PRN    Or    acetaminophen (TYLENOL) suppository 650 mg  650 mg Rectal Q6H PRN       Physical Exam:General appearance: alert, appears stated age, and cooperative.   Head: Normocephalic, without obvious abnormality, atraumatic  Eyes:  Anicteric  Neck: RT IJ HD catheter.  Lungs : No wheezes, tubular BS left post base   Heart: regular rate and rhythm and no S3 or S4  Abdomen: Not distended.  Extremities:  No pretibial edema .  Left leg a bit swollen relative to right.  Neurologic: Responding appropriately.  Alert and oriented x 3      Data Review:     LABS:  Recent Labs     12/26/23  0410 12/25/23  0243 12/24/23  0620 12/23/23  1531 12/22/23  0734 12/18/23  0641    134* 136 133* 133* 134*   K 4.0 3.8 4.4 4.2 4.2 4.5    100 100 98 96* 97   CO2 25 27 25 28 27 24   BUN 31* 18

## 2023-12-26 NOTE — CARE COORDINATION
CM reviewed Pt medicals, Pt was in the hospital and was D/C to [facility]. Pt was D/C on 12/22.    When Pt was in the hospital previously from 11/28-12-22 Pt daughter had spoken with [facility].      CM will meet with Pt daughter to discuss D/C planning.     2:40  Met f/f with Pt daughter, she stated that Pt was not even at R 24 hours before she had to come back.    Pt daughter stated that [facility] was not aware that Pt had a JAIRON drain or was on dialysis.     CM will follow for D/C planning.

## 2023-12-26 NOTE — PROGRESS NOTES
Hospitalist Progress Note               Daily Progress Note: 12/26/2023      Chief complaint:   Chief Complaint   Patient presents with    Fatigue        Subjective:   Hospital course to date:    73-year-old female with widespread metastatic lung cancer with mets to mediastinum, bone, lung and brain, ESRD, right pleural effusion with Aspira drain in place.  She had a recent pathologic left numerous fracture requiring ORIF.  Patient was recently admitted on 11/28 with worsening shortness of breath.  Chest x-ray showed complete opacification of the left hemithorax.  She underwent placement of an Aspira catheter on the left.  She went back and forth to the ICU several times.  She was treated with Zosyn.  Several discussions were held with daughter regarding hospice but her daughter is holding out hope that patient's condition and performance status will improve enough for her to receive immunotherapy.  Her daughter requested SNF and she was discharged to a facility on 12/22.   She returned yesterday within 24 hours due to lethargy and hypotension.  Blood pressures were as low as 60s systolic in the ED.  I initially spoke with the daughter in the ED, outlined grim prognosis and the fact that her mother was probably actively dying.  She did not want to hear this and demanded transfer to [facility].  Attempts were made to accomplish this but she was declined for transfer.  Patient then was admitted to the ICU and started on pressors.  She did agree to DNR CODE STATUS   Patient was admitted to the ICU, started on norepinephrine.     12/26/23  Seen in ICU, remains on norepinephrine        Medications reviewed  Current Facility-Administered Medications   Medication Dose Route Frequency    midodrine (PROAMATINE) tablet 15 mg  15 mg Oral TID WC    sorbitol 70 % liquid 15 mL  15 mL Oral Daily    dibucaine (NUPERCAINAL) 1 % ointment   Topical TID PRN    Benzocaine-Menthol (CEPACOL) 1 lozenge  1 lozenge Oral Q2H PRN    heparin

## 2023-12-27 LAB
ALBUMIN SERPL-MCNC: 2.4 G/DL (ref 3.5–5)
ANION GAP SERPL CALC-SCNC: 9 MMOL/L (ref 5–15)
BASOPHILS # BLD: 0 K/UL (ref 0–0.1)
BASOPHILS NFR BLD: 0 % (ref 0–1)
BUN SERPL-MCNC: 37 MG/DL (ref 6–20)
BUN/CREAT SERPL: 8 (ref 12–20)
CA-I BLD-MCNC: 10.7 MG/DL (ref 8.5–10.1)
CHLORIDE SERPL-SCNC: 99 MMOL/L (ref 97–108)
CO2 SERPL-SCNC: 23 MMOL/L (ref 21–32)
CREAT SERPL-MCNC: 4.72 MG/DL (ref 0.55–1.02)
DIFFERENTIAL METHOD BLD: ABNORMAL
EOSINOPHIL # BLD: 1 K/UL (ref 0–0.4)
EOSINOPHIL NFR BLD: 9 % (ref 0–7)
ERYTHROCYTE [DISTWIDTH] IN BLOOD BY AUTOMATED COUNT: 17.6 % (ref 11.5–14.5)
GLUCOSE SERPL-MCNC: 105 MG/DL (ref 65–100)
HCT VFR BLD AUTO: 26.7 % (ref 35–47)
HGB BLD-MCNC: 8.4 G/DL (ref 11.5–16)
IMM GRANULOCYTES # BLD AUTO: 0 K/UL (ref 0–0.04)
IMM GRANULOCYTES NFR BLD AUTO: 0 % (ref 0–0.5)
IRON SATN MFR SERPL: 40 % (ref 20–50)
IRON SERPL-MCNC: 36 UG/DL (ref 35–150)
LYMPHOCYTES # BLD: 1.1 K/UL (ref 0.8–3.5)
LYMPHOCYTES NFR BLD: 10 % (ref 12–49)
MCH RBC QN AUTO: 28.9 PG (ref 26–34)
MCHC RBC AUTO-ENTMCNC: 31.5 G/DL (ref 30–36.5)
MCV RBC AUTO: 91.8 FL (ref 80–99)
MONOCYTES # BLD: 1 K/UL (ref 0–1)
MONOCYTES NFR BLD: 9 % (ref 5–13)
NEUTS SEG # BLD: 8.2 K/UL (ref 1.8–8)
NEUTS SEG NFR BLD: 72 % (ref 32–75)
NRBC # BLD: 0 K/UL (ref 0–0.01)
NRBC BLD-RTO: 0 PER 100 WBC
PHOSPHATE SERPL-MCNC: 2.7 MG/DL (ref 2.6–4.7)
PLATELET # BLD AUTO: 211 K/UL (ref 150–400)
PMV BLD AUTO: 10.2 FL (ref 8.9–12.9)
POTASSIUM SERPL-SCNC: 4.3 MMOL/L (ref 3.5–5.1)
RBC # BLD AUTO: 2.91 M/UL (ref 3.8–5.2)
RBC MORPH BLD: ABNORMAL
SODIUM SERPL-SCNC: 131 MMOL/L (ref 136–145)
TIBC SERPL-MCNC: 91 UG/DL (ref 250–450)
WBC # BLD AUTO: 11.3 K/UL (ref 3.6–11)

## 2023-12-27 PROCEDURE — 2500000003 HC RX 250 WO HCPCS: Performed by: EMERGENCY MEDICINE

## 2023-12-27 PROCEDURE — 2580000003 HC RX 258: Performed by: HOSPITALIST

## 2023-12-27 PROCEDURE — 85025 COMPLETE CBC W/AUTO DIFF WBC: CPT

## 2023-12-27 PROCEDURE — 80069 RENAL FUNCTION PANEL: CPT

## 2023-12-27 PROCEDURE — 2709999900 HC NON-CHARGEABLE SUPPLY

## 2023-12-27 PROCEDURE — 83540 ASSAY OF IRON: CPT

## 2023-12-27 PROCEDURE — 6370000000 HC RX 637 (ALT 250 FOR IP): Performed by: HOSPITALIST

## 2023-12-27 PROCEDURE — 90935 HEMODIALYSIS ONE EVALUATION: CPT

## 2023-12-27 PROCEDURE — 6360000002 HC RX W HCPCS: Performed by: HOSPITALIST

## 2023-12-27 PROCEDURE — 36415 COLL VENOUS BLD VENIPUNCTURE: CPT

## 2023-12-27 PROCEDURE — 2000000000 HC ICU R&B

## 2023-12-27 PROCEDURE — 6370000000 HC RX 637 (ALT 250 FOR IP): Performed by: INTERNAL MEDICINE

## 2023-12-27 PROCEDURE — 6360000002 HC RX W HCPCS: Performed by: INTERNAL MEDICINE

## 2023-12-27 RX ORDER — MIDODRINE HYDROCHLORIDE 5 MG/1
10 TABLET ORAL EVERY 6 HOURS SCHEDULED
Status: DISCONTINUED | OUTPATIENT
Start: 2023-12-27 | End: 2023-12-30

## 2023-12-27 RX ORDER — LOPERAMIDE HYDROCHLORIDE 2 MG/1
2 CAPSULE ORAL 4 TIMES DAILY PRN
Status: DISCONTINUED | OUTPATIENT
Start: 2023-12-28 | End: 2024-01-05 | Stop reason: HOSPADM

## 2023-12-27 RX ADMIN — MIDODRINE HYDROCHLORIDE 10 MG: 5 TABLET ORAL at 17:35

## 2023-12-27 RX ADMIN — HEPARIN SODIUM 3600 UNITS: 1000 INJECTION INTRAVENOUS; SUBCUTANEOUS at 15:10

## 2023-12-27 RX ADMIN — GUAIFENESIN 600 MG: 600 TABLET, EXTENDED RELEASE ORAL at 08:19

## 2023-12-27 RX ADMIN — PANTOPRAZOLE SODIUM 20 MG: 20 TABLET, DELAYED RELEASE ORAL at 08:20

## 2023-12-27 RX ADMIN — GUAIFENESIN 600 MG: 600 TABLET, EXTENDED RELEASE ORAL at 21:33

## 2023-12-27 RX ADMIN — ASPIRIN 81 MG: 81 TABLET, COATED ORAL at 08:20

## 2023-12-27 RX ADMIN — FOLIC ACID 1 MG: 1 TABLET ORAL at 08:19

## 2023-12-27 RX ADMIN — SODIUM CHLORIDE, PRESERVATIVE FREE 10 ML: 5 INJECTION INTRAVENOUS at 21:37

## 2023-12-27 RX ADMIN — ONDANSETRON 4 MG: 2 INJECTION INTRAMUSCULAR; INTRAVENOUS at 21:33

## 2023-12-27 RX ADMIN — MIDODRINE HYDROCHLORIDE 10 MG: 5 TABLET ORAL at 11:48

## 2023-12-27 RX ADMIN — MIDODRINE HYDROCHLORIDE 15 MG: 5 TABLET ORAL at 08:20

## 2023-12-27 RX ADMIN — Medication 10 MCG/MIN: at 13:24

## 2023-12-27 NOTE — PROGRESS NOTES
[facility]     Renal Daily Progress Note:     Subjective: Patient, chest bronchitis largely resolved postthoracentesis.  However, still dyspnea exertion.  Appetite is improved with not eating much.  No nausea or vomiting.         tolerated dialysis but unable to remove any volume because of hypotension.  Patient seen postdialysis around 6 PM.  Daughter at the bedside.  She is not eating much.  However, still awake and alert.  Not short of breath, no nausea or vomiting, pain is controlled     last dialysis was  and then patient was discharged to a nursing home.  Subsequently returned the next day due to dyspnea per patient's ex-.  However, she seems comfortable today.  She was initially hypotensive with systolic blood pressure 75-80 but was placed on Levophed.  Systolic blood pressure around 100.  She is awake and alert.  Appears comfortable.  Remains hypercalcemic, otherwise labs are okay.  Chest x-ray shows persistent left pleural effusion     awake and alert, pleasant, no distress,  at bedside.  She is complaining perirectal pain when having a bowel movement.  Also is constipated.  No shortness of breath or chest pain.  Next dialysis will be Wednesday.    -feels ok, has diarrhea since yesterday after sorbitol, low appetite. Daughter at bedside  -feels ok, sitting in chair. Still has loose BM. BP stable. HD today    Review of Systems  Pertinent items are noted in HPI.    Objective:     BP (!) 114/43   Pulse 87   Temp 98.7 °F (37.1 °C) (Axillary)   Resp 27   Ht 1.524 m (5')   Wt 56.7 kg (125 lb)   SpO2 100%   BMI 24.41 kg/m²   Temp (24hrs), Av.6 °F (37 °C), Min:97.9 °F (36.6 °C), Max:99.5 °F (37.5 °C)        Intake/Output Summary (Last 24 hours) at 2023 1128  Last data filed at 2023 0700  Gross per 24 hour   Intake 107.09 ml   Output --   Net 107.09 ml           Current Facility-Administered Medications   Medication Dose Route  PLEURA CATH W CUFF    Result Date: 11/29/2023  Technically successful ultrasound and fluoroscopic guided placement of a left thoracic pleural Aspira drainage catheter.  The catheter is ready for immediate use.    XR CHEST PORTABLE    Result Date: 11/28/2023  1. Complete opacification of the left hemithorax, new since the prior study. 2. Mild interstitial prominence throughout the right lung. Correlate for mild interstitial edema. 3. Other incidental findings including destructive bony lesion in the left mid humerus status post internal fixation.      Assessment:   Renal Specific Problems  CKD 6 - usual HD MWF   Clotted AV shunt, s/p Rt IJ permcath  Mild hypercalcemia (corrected)  Left malignant? pleural effusion involving the left hemithorax: [facility] Path did not reveal malignant cells in previous left pleural collection/ aspirate    Volume overload-resolved  Renal cell cancer with mets to skull-s/p XRT  Left humeral pathologic fracture  Anemia-     Plan:     Obtain/ Order: labs/cultures/radiology/procedures:   Renal function profile predialysis Friday 12-22    Therapeutic:   HD MWF, HD today 3K 2 Ca bath, 3 hrs  Midodrine increased to q. 6hr  Transfuse if Hgb <7  Check iron panel, if ok will will start epo  Reduced dialysate calcium to 2.0. Will consider bisphosphonate Rx to reduce calcium.  Hold Sorbitol   Anusol cream for perirectal pain    Physical and Occupational Therapy.

## 2023-12-27 NOTE — PROGRESS NOTES
While patient in deep sleep, SPO2 dropped to 70% sustained with good pleth. Oxygen increased to 4 L then 6 L/min via NC. Patient woken and encouraged to take deep, purposeful breaths. RT notified of change in patient's oxygen needs.

## 2023-12-27 NOTE — PROGRESS NOTES
Primary nurse ambulated patient from bed to toilet for bowel movement with standby assist, walker, and gait belt. Patient then transferred from toilet to chair. Patient sitting in chair at bedside with call bell and bedside table in reach.

## 2023-12-27 NOTE — DIALYSIS
Pt tolerated 3 hour treatment. Removed 500ml and 65.5 liters of blood process. Cath dressing clean, dry and intact. Report given to primary Nurse Anita. Pt remains in bed with eyes open and chest rising and falling.

## 2023-12-27 NOTE — PROGRESS NOTES
Hospitalist Progress Note               Daily Progress Note: 12/27/2023      Chief complaint:   Chief Complaint   Patient presents with    Fatigue        Subjective:   Hospital course to date:    73-year-old female with widespread metastatic lung cancer with mets to mediastinum, bone, lung and brain, ESRD, right pleural effusion with Aspira drain in place.  She had a recent pathologic left numerous fracture requiring ORIF.  Patient was recently admitted on 11/28 with worsening shortness of breath.  Chest x-ray showed complete opacification of the left hemithorax.  She underwent placement of an Aspira catheter on the left.  She went back and forth to the ICU several times.  She was treated with Zosyn.  Several discussions were held with daughter regarding hospice but her daughter is holding out hope that patient's condition and performance status will improve enough for her to receive immunotherapy.  Her daughter requested SNF and she was discharged to a facility on 12/22.   She returned yesterday within 24 hours due to lethargy and hypotension.  Blood pressures were as low as 60s systolic in the ED.  I initially spoke with the daughter in the ED, outlined grim prognosis and the fact that her mother was probably actively dying.  She did not want to hear this and demanded transfer to [facility].  Attempts were made to accomplish this but she was declined for transfer.  Patient then was admitted to the ICU and started on pressors.  She did agree to DNR CODE STATUS   Patient was admitted to the ICU, started on norepinephrine.     12/27/23  Seen in ICU, remains on norepinephrine  HD anticipated today      Medications reviewed  Current Facility-Administered Medications   Medication Dose Route Frequency    midodrine (PROAMATINE) tablet 15 mg  15 mg Oral TID WC    [Held by provider] sorbitol 70 % liquid 15 mL  15 mL Oral Daily    dibucaine (NUPERCAINAL) 1 % ointment   Topical TID PRN    Benzocaine-Menthol (CEPACOL) 1 lozenge

## 2023-12-28 LAB
ALBUMIN SERPL-MCNC: 2.6 G/DL (ref 3.5–5)
ANION GAP SERPL CALC-SCNC: 9 MMOL/L (ref 5–15)
BASOPHILS # BLD: 0 K/UL (ref 0–0.1)
BASOPHILS NFR BLD: 0 % (ref 0–1)
BUN SERPL-MCNC: 26 MG/DL (ref 6–20)
BUN/CREAT SERPL: 8 (ref 12–20)
CA-I BLD-MCNC: 10 MG/DL (ref 8.5–10.1)
CHLORIDE SERPL-SCNC: 100 MMOL/L (ref 97–108)
CO2 SERPL-SCNC: 27 MMOL/L (ref 21–32)
CREAT SERPL-MCNC: 3.28 MG/DL (ref 0.55–1.02)
DIFFERENTIAL METHOD BLD: ABNORMAL
EOSINOPHIL # BLD: 1 K/UL (ref 0–0.4)
EOSINOPHIL NFR BLD: 9 % (ref 0–7)
ERYTHROCYTE [DISTWIDTH] IN BLOOD BY AUTOMATED COUNT: 17.9 % (ref 11.5–14.5)
GLUCOSE SERPL-MCNC: 102 MG/DL (ref 65–100)
HCT VFR BLD AUTO: 25.5 % (ref 35–47)
HGB BLD-MCNC: 7.9 G/DL (ref 11.5–16)
IMM GRANULOCYTES # BLD AUTO: 0.1 K/UL (ref 0–0.04)
IMM GRANULOCYTES NFR BLD AUTO: 1 % (ref 0–0.5)
LYMPHOCYTES # BLD: 1 K/UL (ref 0.8–3.5)
LYMPHOCYTES NFR BLD: 9 % (ref 12–49)
MCH RBC QN AUTO: 28.7 PG (ref 26–34)
MCHC RBC AUTO-ENTMCNC: 31 G/DL (ref 30–36.5)
MCV RBC AUTO: 92.7 FL (ref 80–99)
MONOCYTES # BLD: 0.9 K/UL (ref 0–1)
MONOCYTES NFR BLD: 9 % (ref 5–13)
NEUTS SEG # BLD: 7.4 K/UL (ref 1.8–8)
NEUTS SEG NFR BLD: 72 % (ref 32–75)
NRBC # BLD: 0 K/UL (ref 0–0.01)
NRBC BLD-RTO: 0 PER 100 WBC
PHOSPHATE SERPL-MCNC: 2 MG/DL (ref 2.6–4.7)
PLATELET # BLD AUTO: 196 K/UL (ref 150–400)
PMV BLD AUTO: 9.9 FL (ref 8.9–12.9)
POTASSIUM SERPL-SCNC: 3.8 MMOL/L (ref 3.5–5.1)
RBC # BLD AUTO: 2.75 M/UL (ref 3.8–5.2)
SODIUM SERPL-SCNC: 136 MMOL/L (ref 136–145)
WBC # BLD AUTO: 10.4 K/UL (ref 3.6–11)

## 2023-12-28 PROCEDURE — 6370000000 HC RX 637 (ALT 250 FOR IP): Performed by: INTERNAL MEDICINE

## 2023-12-28 PROCEDURE — 80069 RENAL FUNCTION PANEL: CPT

## 2023-12-28 PROCEDURE — 85025 COMPLETE CBC W/AUTO DIFF WBC: CPT

## 2023-12-28 PROCEDURE — 36415 COLL VENOUS BLD VENIPUNCTURE: CPT

## 2023-12-28 PROCEDURE — 6370000000 HC RX 637 (ALT 250 FOR IP): Performed by: HOSPITALIST

## 2023-12-28 PROCEDURE — 2000000000 HC ICU R&B

## 2023-12-28 PROCEDURE — 2580000003 HC RX 258: Performed by: HOSPITALIST

## 2023-12-28 RX ORDER — TRAMADOL HYDROCHLORIDE 50 MG/1
50 TABLET ORAL EVERY 12 HOURS PRN
Status: DISCONTINUED | OUTPATIENT
Start: 2023-12-28 | End: 2024-01-05 | Stop reason: HOSPADM

## 2023-12-28 RX ORDER — TRAMADOL HYDROCHLORIDE 50 MG/1
50 TABLET ORAL EVERY 6 HOURS PRN
Status: DISCONTINUED | OUTPATIENT
Start: 2023-12-28 | End: 2023-12-28

## 2023-12-28 RX ADMIN — ACETAMINOPHEN 650 MG: 325 TABLET, FILM COATED ORAL at 20:40

## 2023-12-28 RX ADMIN — ASPIRIN 81 MG: 81 TABLET, COATED ORAL at 08:06

## 2023-12-28 RX ADMIN — PANTOPRAZOLE SODIUM 20 MG: 20 TABLET, DELAYED RELEASE ORAL at 08:06

## 2023-12-28 RX ADMIN — MIDODRINE HYDROCHLORIDE 10 MG: 5 TABLET ORAL at 20:41

## 2023-12-28 RX ADMIN — MIDODRINE HYDROCHLORIDE 10 MG: 5 TABLET ORAL at 02:33

## 2023-12-28 RX ADMIN — TRAMADOL HYDROCHLORIDE 50 MG: 50 TABLET ORAL at 12:34

## 2023-12-28 RX ADMIN — SODIUM CHLORIDE, PRESERVATIVE FREE 10 ML: 5 INJECTION INTRAVENOUS at 08:08

## 2023-12-28 RX ADMIN — MIDODRINE HYDROCHLORIDE 10 MG: 5 TABLET ORAL at 08:06

## 2023-12-28 RX ADMIN — GUAIFENESIN 600 MG: 600 TABLET, EXTENDED RELEASE ORAL at 08:06

## 2023-12-28 RX ADMIN — ACETAMINOPHEN 650 MG: 325 TABLET, FILM COATED ORAL at 08:06

## 2023-12-28 RX ADMIN — MIDODRINE HYDROCHLORIDE 10 MG: 5 TABLET ORAL at 12:46

## 2023-12-28 RX ADMIN — SODIUM CHLORIDE, PRESERVATIVE FREE 10 ML: 5 INJECTION INTRAVENOUS at 20:42

## 2023-12-28 RX ADMIN — GUAIFENESIN 600 MG: 600 TABLET, EXTENDED RELEASE ORAL at 20:41

## 2023-12-28 RX ADMIN — FOLIC ACID 1 MG: 1 TABLET ORAL at 08:06

## 2023-12-28 ASSESSMENT — PAIN SCALES - GENERAL
PAINLEVEL_OUTOF10: 0
PAINLEVEL_OUTOF10: 6
PAINLEVEL_OUTOF10: 0
PAINLEVEL_OUTOF10: 6
PAINLEVEL_OUTOF10: 7
PAINLEVEL_OUTOF10: 0

## 2023-12-28 ASSESSMENT — PAIN DESCRIPTION - LOCATION
LOCATION: GENERALIZED
LOCATION: GENERALIZED

## 2023-12-28 NOTE — PROGRESS NOTES
Renal Dosing/Monitoring  Medication: tramadol 50 mg PO q6h prn  Recent Labs     12/26/23  0410 12/27/23  0310 12/28/23  0435   BUN 31* 37* 26*     Estimated CrCl:  ESRD on HD  Plan: Change to tramadol 50 mg PO q12h prn  per [facility] P&T Committee Protocol with respect to renal function. Pharmacy will continue to monitor patient daily and will make dosage adjustments based upon changing renal function.

## 2023-12-28 NOTE — PROGRESS NOTES
Hospice medical director    Chart reviewed and discussed with hospice liaison-Chikis.  In reading all the notes, patient is alert, no new complaints, today note states she is comfortable, tolerating oral medications and was given tramadol at 12:30 PM.  Plan is to titrate off Levophed and see how she tolerates.  At this point, patient is not appropriate for GIP level care as she is taking all oral medications and symptoms are managed.  Certainly at risk to have a further decline as Levophed is titrated off but once again, for GIP level care patient needs to have symptoms that are not able to be managed in the home setting.  It appears [facility] has agreed to follow the patient in a facility as long as stable to move.  The best thing to do at this point is to wean off the Levophed, monitor for symptoms, and potentially have IV medications available for comfort if symptoms become unmanageable with oral medications.  Our team will continue to follow peripherally and if she appears more appropriate for GIP level care, we will assist with the transition.

## 2023-12-28 NOTE — CARE COORDINATION
CM met f/f with Pt daughter, she asked CM to send a referral to [facility] for Pt to be admitted with [facility].     Will send a referral to [facility] and [facility].     Pt daughter signed the referral for [facility], will place choice letter on Pt chart.     2:00 pm  [facility] Pt.    Dr. Angeles called CM and stated that Pt is on Norepi and she has not been weaned off of it and if it gets stopped Pt could pass quickly.    Dr. Angeles put in an inpt hospice referral.     CM sent referral to [facility].     Pt will not D/C today, spoke with Pt daughter explained to her that PT may be GIP and if she is not then she can go to [facility].     Pt daughter asked about taking Pt home, she is going to call [facility] to inquire about taking Pt home.

## 2023-12-28 NOTE — PROGRESS NOTES
No intake/output data recorded.   12/26 1901 - 12/28 0700  In: 831.9 [I.V.:231.9]  Out: 1100     Mode Rate TV Press PEEP FiO2 PIP Min. Vent               50 %              Vitals:    12/28/23 0800 12/28/23 0900 12/28/23 1000 12/28/23 1100   BP: (!) 112/45 (!) 111/39 (!) 119/43 (!) 97/27   Pulse: 91 91 95 94   Resp: 18 17 20 24   Temp:    98.2 °F (36.8 °C)   TempSrc:    Oral   SpO2: 100% 100% 100% 99%   Weight:       Height:         Physical Exam:General appearance: alert, appears stated age, and cooperative.   Head: Normocephalic, without obvious abnormality, atraumatic  Eyes:  Anicteric  Neck: RT IJ HD catheter.  Lungs : No wheezes, tubular BS left post base   Heart: regular rate and rhythm and no S3 or S4  Abdomen: Not distended.  Extremities:  No pretibial edema  Neurologic: Responding appropriately.  Alert and oriented x 3        Data Review:       Recent Days:  Recent Labs     12/27/23  0310 12/28/23  0435   WBC 11.3* 10.4   HGB 8.4* 7.9*   HCT 26.7* 25.5*    196       Recent Labs     12/26/23  0410 12/27/23  0310 12/28/23  0435    131* 136   K 4.0 4.3 3.8    99 100   CO2 25 23 27   GLUCOSE 92 105* 102*   BUN 31* 37* 26*   CREATININE 3.96* 4.72* 3.28*   CALCIUM 10.7* 10.7* 10.0   PHOS 2.7 2.7 2.0*   LABALBU 2.7* 2.4* 2.6*       No results for input(s): \"PHART\", \"WSF4QMB\", \"PO2ART\", \"HCD4YPB\", \"BEART\", \"PXD4KUAF\", \"HGBART\", \"FY1UCNGMT\", \"FIO2A\", \"I6XZZKVR\", \"OXYHEM\", \"CARBOXHGBART\", \"METHGBART\", \"J2PLATASM\", \"PHCORART\", \"TEMP\" in the last 72 hours.    Invalid input(s): \"VYX8BXLYH\"    24 Hour Results:  Recent Results (from the past 24 hour(s))   Renal Function Panel    Collection Time: 12/28/23  4:35 AM   Result Value Ref Range    Sodium 136 136 - 145 mmol/L    Potassium 3.8 3.5 - 5.1 mmol/L    Chloride 100 97 - 108 mmol/L    CO2 27 21 - 32 mmol/L    Anion Gap 9 5 - 15 mmol/L    Glucose 102 (H) 65 - 100 mg/dL    BUN 26 (H) 6 - 20 mg/dL    Creatinine 3.28 (H) 0.55 - 1.02 mg/dL    Bun/Cre Ratio 8  [facility].    Pt daughter signed the referral for [facility], will place choice letter on Pt chart.    12/28/23 no new complaints, tolerating medications well  Anticipate discharge to home hospice today or tomorrow    Severe hypotension, not clearly representing septic or hypovolemic shock.   -Required norepinephrine drip in ICU     Chronic hypoxic respiratory failure     Large left pleural effusion, likely due to malignancy.  Status post Aspira catheter placement     Renal carcinoma with widespread metastases including brain, bony and lung metastasis     End-stage renal disease, on hemodialysis     COPD with exacerbation     Acute on chronic anemia     Recent pathologic fracture left numerous     Plan:  Midodrine      Her daughter is now more amenable to consideration of hospice     Code status: DNR    Social determinants of health: none      Estimated discharge date//time frame/disposition: 1-2 days     Barriers to discharge: hospice placement          LOU AVILA MD

## 2023-12-29 LAB
ALBUMIN SERPL-MCNC: 2.6 G/DL (ref 3.5–5)
ANION GAP SERPL CALC-SCNC: 7 MMOL/L (ref 5–15)
BACTERIA SPEC CULT: NORMAL
BASOPHILS # BLD: 0 K/UL (ref 0–0.1)
BASOPHILS NFR BLD: 0 % (ref 0–1)
BUN SERPL-MCNC: 34 MG/DL (ref 6–20)
BUN/CREAT SERPL: 8 (ref 12–20)
CA-I BLD-MCNC: 10.3 MG/DL (ref 8.5–10.1)
CHLORIDE SERPL-SCNC: 101 MMOL/L (ref 97–108)
CO2 SERPL-SCNC: 26 MMOL/L (ref 21–32)
CREAT SERPL-MCNC: 4.28 MG/DL (ref 0.55–1.02)
DIFFERENTIAL METHOD BLD: ABNORMAL
EOSINOPHIL # BLD: 1.3 K/UL (ref 0–0.4)
EOSINOPHIL NFR BLD: 10 % (ref 0–7)
ERYTHROCYTE [DISTWIDTH] IN BLOOD BY AUTOMATED COUNT: 17.9 % (ref 11.5–14.5)
GLUCOSE SERPL-MCNC: 120 MG/DL (ref 65–100)
HCT VFR BLD AUTO: 27.1 % (ref 35–47)
HGB BLD-MCNC: 8.2 G/DL (ref 11.5–16)
IMM GRANULOCYTES # BLD AUTO: 0.1 K/UL (ref 0–0.04)
IMM GRANULOCYTES NFR BLD AUTO: 1 % (ref 0–0.5)
LYMPHOCYTES # BLD: 1.2 K/UL (ref 0.8–3.5)
LYMPHOCYTES NFR BLD: 9 % (ref 12–49)
Lab: NORMAL
MCH RBC QN AUTO: 28.5 PG (ref 26–34)
MCHC RBC AUTO-ENTMCNC: 30.3 G/DL (ref 30–36.5)
MCV RBC AUTO: 94.1 FL (ref 80–99)
MONOCYTES # BLD: 0.9 K/UL (ref 0–1)
MONOCYTES NFR BLD: 7 % (ref 5–13)
NEUTS SEG # BLD: 9.8 K/UL (ref 1.8–8)
NEUTS SEG NFR BLD: 73 % (ref 32–75)
NRBC # BLD: 0 K/UL (ref 0–0.01)
NRBC BLD-RTO: 0 PER 100 WBC
PHOSPHATE SERPL-MCNC: 2.8 MG/DL (ref 2.6–4.7)
PLATELET # BLD AUTO: 230 K/UL (ref 150–400)
PMV BLD AUTO: 9.6 FL (ref 8.9–12.9)
POTASSIUM SERPL-SCNC: 4.3 MMOL/L (ref 3.5–5.1)
RBC # BLD AUTO: 2.88 M/UL (ref 3.8–5.2)
RBC MORPH BLD: ABNORMAL
SODIUM SERPL-SCNC: 134 MMOL/L (ref 136–145)
WBC # BLD AUTO: 13.3 K/UL (ref 3.6–11)

## 2023-12-29 PROCEDURE — 6370000000 HC RX 637 (ALT 250 FOR IP): Performed by: INTERNAL MEDICINE

## 2023-12-29 PROCEDURE — 80069 RENAL FUNCTION PANEL: CPT

## 2023-12-29 PROCEDURE — 2500000003 HC RX 250 WO HCPCS: Performed by: EMERGENCY MEDICINE

## 2023-12-29 PROCEDURE — 85025 COMPLETE CBC W/AUTO DIFF WBC: CPT

## 2023-12-29 PROCEDURE — 6370000000 HC RX 637 (ALT 250 FOR IP): Performed by: HOSPITALIST

## 2023-12-29 PROCEDURE — 36415 COLL VENOUS BLD VENIPUNCTURE: CPT

## 2023-12-29 PROCEDURE — 2000000000 HC ICU R&B

## 2023-12-29 PROCEDURE — 2580000003 HC RX 258: Performed by: HOSPITALIST

## 2023-12-29 RX ORDER — NOREPINEPHRINE BITARTRATE 0.06 MG/ML
1-100 INJECTION, SOLUTION INTRAVENOUS CONTINUOUS
Status: DISCONTINUED | OUTPATIENT
Start: 2023-12-29 | End: 2024-01-03

## 2023-12-29 RX ORDER — HYDROXYZINE PAMOATE 25 MG/1
50 CAPSULE ORAL 3 TIMES DAILY PRN
Status: DISCONTINUED | OUTPATIENT
Start: 2023-12-29 | End: 2024-01-05 | Stop reason: HOSPADM

## 2023-12-29 RX ADMIN — MIDODRINE HYDROCHLORIDE 10 MG: 5 TABLET ORAL at 02:03

## 2023-12-29 RX ADMIN — PANTOPRAZOLE SODIUM 20 MG: 20 TABLET, DELAYED RELEASE ORAL at 08:46

## 2023-12-29 RX ADMIN — ASPIRIN 81 MG: 81 TABLET, COATED ORAL at 08:46

## 2023-12-29 RX ADMIN — HYDROXYZINE PAMOATE 50 MG: 25 CAPSULE ORAL at 17:47

## 2023-12-29 RX ADMIN — MIDODRINE HYDROCHLORIDE 10 MG: 5 TABLET ORAL at 20:44

## 2023-12-29 RX ADMIN — TRAMADOL HYDROCHLORIDE 50 MG: 50 TABLET ORAL at 13:00

## 2023-12-29 RX ADMIN — ACETAMINOPHEN 650 MG: 325 TABLET, FILM COATED ORAL at 21:25

## 2023-12-29 RX ADMIN — FOLIC ACID 1 MG: 1 TABLET ORAL at 08:46

## 2023-12-29 RX ADMIN — MIDODRINE HYDROCHLORIDE 10 MG: 5 TABLET ORAL at 08:46

## 2023-12-29 RX ADMIN — GUAIFENESIN 600 MG: 600 TABLET, EXTENDED RELEASE ORAL at 08:49

## 2023-12-29 RX ADMIN — GUAIFENESIN 600 MG: 600 TABLET, EXTENDED RELEASE ORAL at 20:44

## 2023-12-29 RX ADMIN — MIDODRINE HYDROCHLORIDE 10 MG: 5 TABLET ORAL at 13:51

## 2023-12-29 RX ADMIN — SODIUM CHLORIDE, PRESERVATIVE FREE 10 ML: 5 INJECTION INTRAVENOUS at 20:44

## 2023-12-29 RX ADMIN — Medication 14 MCG/MIN: at 11:30

## 2023-12-29 RX ADMIN — Medication 5 MG: at 21:26

## 2023-12-29 ASSESSMENT — PAIN SCALES - GENERAL
PAINLEVEL_OUTOF10: 6
PAINLEVEL_OUTOF10: 3
PAINLEVEL_OUTOF10: 0
PAINLEVEL_OUTOF10: 0

## 2023-12-29 ASSESSMENT — PAIN DESCRIPTION - LOCATION: LOCATION: GENERALIZED

## 2023-12-29 ASSESSMENT — PAIN DESCRIPTION - DESCRIPTORS: DESCRIPTORS: ACHING

## 2023-12-29 NOTE — PROGRESS NOTES
Hospitalist Progress Note               Daily Progress Note: 12/29/2023      Chief complaint:   Chief Complaint   Patient presents with    Fatigue        Subjective:   Hospital course to date:    73-year-old female with widespread metastatic lung cancer with mets to mediastinum, bone, lung and brain, ESRD, right pleural effusion with Aspira drain in place.  She had a recent pathologic left numerous fracture requiring ORIF.  Patient was recently admitted on 11/28 with worsening shortness of breath.  Chest x-ray showed complete opacification of the left hemithorax.  She underwent placement of an Aspira catheter on the left.  She went back and forth to the ICU several times.  She was treated with Zosyn.  Several discussions were held with daughter regarding hospice but her daughter is holding out hope that patient's condition and performance status will improve enough for her to receive immunotherapy.  Her daughter requested SNF and she was discharged to a facility on 12/22.   She returned yesterday within 24 hours due to lethargy and hypotension.  Blood pressures were as low as 60s systolic in the ED.  I initially spoke with the daughter in the ED, outlined grim prognosis and the fact that her mother was probably actively dying.  She did not want to hear this and demanded transfer to [facility].  Attempts were made to accomplish this but she was declined for transfer.  Patient then was admitted to the ICU and started on pressors.  She did agree to DNR CODE STATUS   Patient was admitted to the ICU, started on norepinephrine.     12/28/23 case management  CM met f/f with Pt daughter, she asked CM to send a referral to [facility] for Pt to be admitted with [facility].   Will send a referral to [facility] and [facility].    Pt daughter signed the referral for [facility], will place choice letter on Pt chart.    12/29/23 no new complaints,  AM   Result Value Ref Range    Magnesium 2.2 1.6 - 2.4 mg/dL   Renal Function Panel    Collection Time: 12/22/23  7:34 AM   Result Value Ref Range    Sodium 133 (L) 136 - 145 mmol/L    Potassium 4.2 3.5 - 5.1 mmol/L    Chloride 96 (L) 97 - 108 mmol/L    CO2 27 21 - 32 mmol/L    Anion Gap 10 5 - 15 mmol/L    Glucose 81 65 - 100 mg/dL    BUN 27 (H) 6 - 20 mg/dL    Creatinine 4.14 (H) 0.55 - 1.02 mg/dL    Bun/Cre Ratio 7 (L) 12 - 20      Est, Glom Filt Rate 11 (L) >60 ml/min/1.73m2    Calcium 11.3 (H) 8.5 - 10.1 mg/dL    Phosphorus 3.3 2.6 - 4.7 mg/dL    Albumin 2.9 (L) 3.5 - 5.0 g/dL   CBC with Auto Differential    Collection Time: 12/22/23  7:34 AM   Result Value Ref Range    WBC 6.4 3.6 - 11.0 K/uL    RBC 2.76 (L) 3.80 - 5.20 M/uL    Hemoglobin 7.9 (L) 11.5 - 16.0 g/dL    Hematocrit 26.9 (L) 35.0 - 47.0 %    MCV 97.5 80.0 - 99.0 FL    MCH 28.6 26.0 - 34.0 PG    MCHC 29.4 (L) 30.0 - 36.5 g/dL    RDW 18.2 (H) 11.5 - 14.5 %    Platelets 190 150 - 400 K/uL    MPV 10.2 8.9 - 12.9 FL    Nucleated RBCs 0.5 (H) 0.0  WBC    nRBC 0.03 (H) 0.00 - 0.01 K/uL    Neutrophils % 70 32 - 75 %    Lymphocytes % 13 12 - 49 %    Monocytes % 10 5 - 13 %    Eosinophils % 5 0 - 7 %    Basophils % 1 0 - 1 %    Immature Granulocytes 1 (H) 0 - 0.5 %    Neutrophils Absolute 4.5 1.8 - 8.0 K/UL    Lymphocytes Absolute 0.8 0.8 - 3.5 K/UL    Monocytes Absolute 0.6 0.0 - 1.0 K/UL    Eosinophils Absolute 0.3 0.0 - 0.4 K/UL    Basophils Absolute 0.1 0.0 - 0.1 K/UL    Absolute Immature Granulocyte 0.1 (H) 0.00 - 0.04 K/UL    Differential Type AUTOMATED     EKG 12 Lead    Collection Time: 12/23/23  3:14 PM   Result Value Ref Range    Ventricular Rate 85 BPM    Atrial Rate 85 BPM    P-R Interval 164 ms    QRS Duration 108 ms    Q-T Interval 340 ms    QTc Calculation (Bazett) 404 ms    P Axis 76 degrees    R Axis 269 degrees    T Axis 270 degrees    Diagnosis       Normal sinus rhythm  Right superior axis deviation  Pulmonary disease pattern  Right

## 2023-12-29 NOTE — CARE COORDINATION
CM reviewed Pt medicals, as of this time Pt is not GIP appropriate for Hospice.    YAN called Wes Liaison for [facility]. Wes stated that they will have the DME delivered to Pt home this afternoon.     Contact for [facility], [place], [phone]    Pt will D/C home in the AM to follow her and see how she does with being weaned off the Levophed.     CM will follow Pt for D/C needs.     4:15  YAN called Pt daughter, she stated that the equipment has not made it to Pt home as of this time. Pt daughter stated that she has to make sure that it will be a safe DC for Pt and that everything is set up.

## 2023-12-29 NOTE — PROGRESS NOTES
0200: pt states she has to go to bathroom, offered bedpan to pt due to present weakness. Pt insisted on getting up to bathroom, expressed my concern on getting her up due to generalized weakness and low diastolic BP. Pt still insisted on getting up to go to the bathroom, refused bedpan. Offered bedside commode, pt agreed. Ambulated pt from bed to bedside commode and bedside commode to bed with x2 assistance. Pt tolerated well, no bowel movement or urine occurrence.

## 2023-12-30 LAB
ALBUMIN SERPL-MCNC: 2.6 G/DL (ref 3.5–5)
ANION GAP SERPL CALC-SCNC: 8 MMOL/L (ref 5–15)
BASOPHILS # BLD: 0.1 K/UL (ref 0–0.1)
BASOPHILS NFR BLD: 1 % (ref 0–1)
BUN SERPL-MCNC: 43 MG/DL (ref 6–20)
BUN/CREAT SERPL: 9 (ref 12–20)
CA-I BLD-MCNC: 10.9 MG/DL (ref 8.5–10.1)
CHLORIDE SERPL-SCNC: 101 MMOL/L (ref 97–108)
CO2 SERPL-SCNC: 26 MMOL/L (ref 21–32)
CREAT SERPL-MCNC: 4.87 MG/DL (ref 0.55–1.02)
DIFFERENTIAL METHOD BLD: ABNORMAL
EOSINOPHIL # BLD: 1.6 K/UL (ref 0–0.4)
EOSINOPHIL NFR BLD: 12 % (ref 0–7)
ERYTHROCYTE [DISTWIDTH] IN BLOOD BY AUTOMATED COUNT: 17.9 % (ref 11.5–14.5)
GLUCOSE SERPL-MCNC: 90 MG/DL (ref 65–100)
HCT VFR BLD AUTO: 26.6 % (ref 35–47)
HGB BLD-MCNC: 8.1 G/DL (ref 11.5–16)
IMM GRANULOCYTES # BLD AUTO: 0.1 K/UL (ref 0–0.04)
IMM GRANULOCYTES NFR BLD AUTO: 1 % (ref 0–0.5)
LYMPHOCYTES # BLD: 1.2 K/UL (ref 0.8–3.5)
LYMPHOCYTES NFR BLD: 9 % (ref 12–49)
MCH RBC QN AUTO: 28.4 PG (ref 26–34)
MCHC RBC AUTO-ENTMCNC: 30.5 G/DL (ref 30–36.5)
MCV RBC AUTO: 93.3 FL (ref 80–99)
MONOCYTES # BLD: 0.9 K/UL (ref 0–1)
MONOCYTES NFR BLD: 7 % (ref 5–13)
NEUTS SEG # BLD: 9.2 K/UL (ref 1.8–8)
NEUTS SEG NFR BLD: 70 % (ref 32–75)
NRBC # BLD: 0 K/UL (ref 0–0.01)
NRBC BLD-RTO: 0 PER 100 WBC
PHOSPHATE SERPL-MCNC: 3.4 MG/DL (ref 2.6–4.7)
PLATELET # BLD AUTO: 255 K/UL (ref 150–400)
PMV BLD AUTO: 10.4 FL (ref 8.9–12.9)
POTASSIUM SERPL-SCNC: 4.8 MMOL/L (ref 3.5–5.1)
RBC # BLD AUTO: 2.85 M/UL (ref 3.8–5.2)
RBC MORPH BLD: ABNORMAL
RBC MORPH BLD: ABNORMAL
SODIUM SERPL-SCNC: 135 MMOL/L (ref 136–145)
WBC # BLD AUTO: 13.1 K/UL (ref 3.6–11)

## 2023-12-30 PROCEDURE — 94761 N-INVAS EAR/PLS OXIMETRY MLT: CPT

## 2023-12-30 PROCEDURE — 80069 RENAL FUNCTION PANEL: CPT

## 2023-12-30 PROCEDURE — 2700000000 HC OXYGEN THERAPY PER DAY

## 2023-12-30 PROCEDURE — 6370000000 HC RX 637 (ALT 250 FOR IP): Performed by: HOSPITALIST

## 2023-12-30 PROCEDURE — 36415 COLL VENOUS BLD VENIPUNCTURE: CPT

## 2023-12-30 PROCEDURE — 2500000003 HC RX 250 WO HCPCS: Performed by: INTERNAL MEDICINE

## 2023-12-30 PROCEDURE — 85025 COMPLETE CBC W/AUTO DIFF WBC: CPT

## 2023-12-30 PROCEDURE — 2580000003 HC RX 258: Performed by: HOSPITALIST

## 2023-12-30 PROCEDURE — 2000000000 HC ICU R&B

## 2023-12-30 PROCEDURE — 6370000000 HC RX 637 (ALT 250 FOR IP): Performed by: INTERNAL MEDICINE

## 2023-12-30 RX ORDER — MIDODRINE HYDROCHLORIDE 5 MG/1
20 TABLET ORAL EVERY 6 HOURS SCHEDULED
Status: DISCONTINUED | OUTPATIENT
Start: 2023-12-30 | End: 2024-01-03

## 2023-12-30 RX ADMIN — MIDODRINE HYDROCHLORIDE 20 MG: 5 TABLET ORAL at 09:13

## 2023-12-30 RX ADMIN — ACETAMINOPHEN 650 MG: 325 TABLET, FILM COATED ORAL at 20:59

## 2023-12-30 RX ADMIN — GUAIFENESIN 600 MG: 600 TABLET, EXTENDED RELEASE ORAL at 20:59

## 2023-12-30 RX ADMIN — PANTOPRAZOLE SODIUM 20 MG: 20 TABLET, DELAYED RELEASE ORAL at 09:13

## 2023-12-30 RX ADMIN — ASPIRIN 81 MG: 81 TABLET, COATED ORAL at 09:13

## 2023-12-30 RX ADMIN — HYDROXYZINE PAMOATE 50 MG: 25 CAPSULE ORAL at 20:59

## 2023-12-30 RX ADMIN — MIDODRINE HYDROCHLORIDE 20 MG: 5 TABLET ORAL at 15:24

## 2023-12-30 RX ADMIN — SODIUM CHLORIDE, PRESERVATIVE FREE 10 ML: 5 INJECTION INTRAVENOUS at 22:15

## 2023-12-30 RX ADMIN — Medication 18 MCG/MIN: at 22:14

## 2023-12-30 RX ADMIN — MIDODRINE HYDROCHLORIDE 20 MG: 5 TABLET ORAL at 20:59

## 2023-12-30 RX ADMIN — MIDODRINE HYDROCHLORIDE 10 MG: 5 TABLET ORAL at 03:28

## 2023-12-30 RX ADMIN — GUAIFENESIN 600 MG: 600 TABLET, EXTENDED RELEASE ORAL at 09:13

## 2023-12-30 RX ADMIN — Medication 14 MCG/MIN: at 06:18

## 2023-12-30 RX ADMIN — HYDROXYZINE PAMOATE 50 MG: 25 CAPSULE ORAL at 03:32

## 2023-12-30 RX ADMIN — FOLIC ACID 1 MG: 1 TABLET ORAL at 09:13

## 2023-12-30 ASSESSMENT — PAIN SCALES - GENERAL
PAINLEVEL_OUTOF10: 0

## 2023-12-30 NOTE — PROGRESS NOTES
opacity slightly increased. Right lung is relatively clear.     Slightly increased left-sided pleural effusion and patchy airspace opacities.    XR CHEST PORTABLE    Result Date: 12/2/2023  EXAM:  XR CHEST PORTABLE INDICATION: Respiratory distress COMPARISON: December 2, 2023 at 6:14 PM TECHNIQUE: Portable chest AP view obtained at 11:01 PM FINDINGS: Right internal jugular dialysis catheter and left chest tube are stable in position. Is unchanged cardiomegaly. Left pleural effusion and patchy left-sided airspace disease or not significantly changed. There is no pneumothorax. The visualized bones and upper abdomen are unremarkable.     Stable appearance of the chest.     XR CHEST PORTABLE    Result Date: 12/2/2023  EXAM:  XR CHEST PORTABLE INDICATION: Pleural effusion COMPARISON: 12/1/2023 TECHNIQUE: Portable chest AP view FINDINGS: Right internal jugular temporary dialysis catheter and left chest tube are stable in position. There is unchanged cardiomegaly. Moderately large left pleural effusion and left-sided airspace disease or not significantly changed. There is no pneumothorax. The visualized bones and upper abdomen are unremarkable.     Stable appearance of the chest.     XR CHEST 1 VIEW    Result Date: 12/1/2023  Clinical indication: Line placement. Frontal view of the chest obtained compared to November 30. The tip of the double lumen catheter is in the superior vena cava. No pneumothorax. No other changes.     Double lumen catheter as above.    Vascular duplex upper extremity venous left    Result Date: 12/1/2023    Superficial thrombophlebitis (mid cephalic).   No evidence of acute deep vein thrombosis in the left upper extremity. This is vascular report on Lizette Trinh Winf[facility].  Patient's YOB: 1950. Date of examination: November 30, 2023. Examination: Vascular duplex upper extremity venous left. Technician: Ms. Becky De Paz. Clinical history: Patient is 73 years old woman with metastatic  hospice but her daughter is holding out hope that patient's condition and performance status will improve enough for her to receive immunotherapy.  Her daughter requested SNF and she was discharged to a facility on 12/22.   She returned yesterday within 24 hours due to lethargy and hypotension.  Blood pressures were as low as 60s systolic in the ED.  I initially spoke with the daughter in the ED, outlined grim prognosis and the fact that her mother was probably actively dying.  She did not want to hear this and demanded transfer to [facility].  Attempts were made to accomplish this but she was declined for transfer.  Patient then was admitted to the ICU and started on pressors.  She did agree to DNR CODE STATUS   Patient was admitted to the ICU, started on norepinephrine.     12/27/23  Seen in ICU, remains on norepinephrine  HD anticipated today    12/28/23 case management  CM met f/f with Pt daughter, she asked CM to send a referral to [facility] for Pt to be admitted with [facility].   Will send a referral to [facility] and [facility].    Pt daughter signed the referral for [facility], will place choice letter on Pt chart.    12/30/23 no new complaints, tolerating medications well  Goal to wean off levophed prior to anticipated discharge home hospice    ASSESSMENT AND PLAN    Severe hypotension, not clearly representing septic or hypovolemic shock.   -Required norepinephrine drip in ICU     Chronic hypoxic respiratory failure     Large left pleural effusion, likely due to malignancy.  Status post Aspira catheter placement     Renal carcinoma with widespread metastases including brain, bony and lung metastasis     End-stage renal disease, on hemodialysis     COPD with exacerbation     Acute on chronic anemia     Recent pathologic fracture left humerus     Plan:  Midodrine      Family is now more amenable to consideration of

## 2023-12-30 NOTE — CARE COORDINATION
CM called Pt daughter, she stated that they delivered the DME yesterday evening. Pt daughter stated that when they go the bed there was a big crease in the middle of the bed. Pt daughter stated that the  told her that they could not use the bed.    Pt daughter stated that the DME  told her it would be Tuesday before they could replace the mattress.      Pt will be here until Tuesday, waiting on DME. Will have [facility] re-evaluate Pt for GIP.

## 2023-12-30 NOTE — PROGRESS NOTES
Pt has continuous low diastolic pressures. Pt has multiple vascular issues, and edema in both lower extremities. Both upper extremities are unable to be used for BP readings. Patient is not showing any signs or symptoms of distress at the time of this writing. Patient has no complaints, is resting comfortably, rouses easily to voice and is A&O x 4.

## 2023-12-31 LAB
ALBUMIN SERPL-MCNC: 2.5 G/DL (ref 3.5–5)
ANION GAP SERPL CALC-SCNC: 7 MMOL/L (ref 5–15)
BASOPHILS # BLD: 0 K/UL (ref 0–0.1)
BASOPHILS NFR BLD: 0 % (ref 0–1)
BUN SERPL-MCNC: 53 MG/DL (ref 6–20)
BUN/CREAT SERPL: 9 (ref 12–20)
CA-I BLD-MCNC: 10.9 MG/DL (ref 8.5–10.1)
CHLORIDE SERPL-SCNC: 102 MMOL/L (ref 97–108)
CO2 SERPL-SCNC: 25 MMOL/L (ref 21–32)
CREAT SERPL-MCNC: 5.61 MG/DL (ref 0.55–1.02)
DIFFERENTIAL METHOD BLD: ABNORMAL
EOSINOPHIL # BLD: 1.4 K/UL (ref 0–0.4)
EOSINOPHIL NFR BLD: 10 % (ref 0–7)
ERYTHROCYTE [DISTWIDTH] IN BLOOD BY AUTOMATED COUNT: 18.3 % (ref 11.5–14.5)
GLUCOSE SERPL-MCNC: 109 MG/DL (ref 65–100)
HCT VFR BLD AUTO: 25.7 % (ref 35–47)
HGB BLD-MCNC: 8.1 G/DL (ref 11.5–16)
IMM GRANULOCYTES # BLD AUTO: 0 K/UL
IMM GRANULOCYTES NFR BLD AUTO: 0 %
LYMPHOCYTES # BLD: 1.5 K/UL (ref 0.8–3.5)
LYMPHOCYTES NFR BLD: 11 % (ref 12–49)
MCH RBC QN AUTO: 29.6 PG (ref 26–34)
MCHC RBC AUTO-ENTMCNC: 31.5 G/DL (ref 30–36.5)
MCV RBC AUTO: 93.8 FL (ref 80–99)
MONOCYTES # BLD: 0.8 K/UL (ref 0–1)
MONOCYTES NFR BLD: 6 % (ref 5–13)
NEUTS SEG # BLD: 10.3 K/UL (ref 1.8–8)
NEUTS SEG NFR BLD: 73 % (ref 32–75)
NRBC # BLD: 0 K/UL (ref 0–0.01)
NRBC BLD-RTO: 0 PER 100 WBC
PHOSPHATE SERPL-MCNC: 3.5 MG/DL (ref 2.6–4.7)
PLATELET # BLD AUTO: 268 K/UL (ref 150–400)
PMV BLD AUTO: 10 FL (ref 8.9–12.9)
POTASSIUM SERPL-SCNC: 4.9 MMOL/L (ref 3.5–5.1)
RBC # BLD AUTO: 2.74 M/UL (ref 3.8–5.2)
RBC MORPH BLD: ABNORMAL
SODIUM SERPL-SCNC: 134 MMOL/L (ref 136–145)
WBC # BLD AUTO: 14 K/UL (ref 3.6–11)

## 2023-12-31 PROCEDURE — 2700000000 HC OXYGEN THERAPY PER DAY

## 2023-12-31 PROCEDURE — 80069 RENAL FUNCTION PANEL: CPT

## 2023-12-31 PROCEDURE — 87070 CULTURE OTHR SPECIMN AEROBIC: CPT

## 2023-12-31 PROCEDURE — 6370000000 HC RX 637 (ALT 250 FOR IP): Performed by: HOSPITALIST

## 2023-12-31 PROCEDURE — 2500000003 HC RX 250 WO HCPCS: Performed by: INTERNAL MEDICINE

## 2023-12-31 PROCEDURE — 6370000000 HC RX 637 (ALT 250 FOR IP): Performed by: INTERNAL MEDICINE

## 2023-12-31 PROCEDURE — 94761 N-INVAS EAR/PLS OXIMETRY MLT: CPT

## 2023-12-31 PROCEDURE — 2000000000 HC ICU R&B

## 2023-12-31 PROCEDURE — 87205 SMEAR GRAM STAIN: CPT

## 2023-12-31 PROCEDURE — 85025 COMPLETE CBC W/AUTO DIFF WBC: CPT

## 2023-12-31 PROCEDURE — 2580000003 HC RX 258: Performed by: HOSPITALIST

## 2023-12-31 RX ADMIN — GUAIFENESIN 600 MG: 600 TABLET, EXTENDED RELEASE ORAL at 08:38

## 2023-12-31 RX ADMIN — HYDROXYZINE PAMOATE 50 MG: 25 CAPSULE ORAL at 05:20

## 2023-12-31 RX ADMIN — GUAIFENESIN 600 MG: 600 TABLET, EXTENDED RELEASE ORAL at 20:25

## 2023-12-31 RX ADMIN — TRAMADOL HYDROCHLORIDE 50 MG: 50 TABLET ORAL at 18:08

## 2023-12-31 RX ADMIN — ACETAMINOPHEN 650 MG: 325 TABLET, FILM COATED ORAL at 05:19

## 2023-12-31 RX ADMIN — Medication 15 MCG/MIN: at 13:44

## 2023-12-31 RX ADMIN — MIDODRINE HYDROCHLORIDE 20 MG: 5 TABLET ORAL at 13:44

## 2023-12-31 RX ADMIN — MIDODRINE HYDROCHLORIDE 20 MG: 5 TABLET ORAL at 02:30

## 2023-12-31 RX ADMIN — FOLIC ACID 1 MG: 1 TABLET ORAL at 08:38

## 2023-12-31 RX ADMIN — SODIUM CHLORIDE, PRESERVATIVE FREE 10 ML: 5 INJECTION INTRAVENOUS at 20:31

## 2023-12-31 RX ADMIN — MIDODRINE HYDROCHLORIDE 20 MG: 5 TABLET ORAL at 08:38

## 2023-12-31 RX ADMIN — MIDODRINE HYDROCHLORIDE 20 MG: 5 TABLET ORAL at 20:25

## 2023-12-31 RX ADMIN — PANTOPRAZOLE SODIUM 20 MG: 20 TABLET, DELAYED RELEASE ORAL at 08:38

## 2023-12-31 RX ADMIN — ASPIRIN 81 MG: 81 TABLET, COATED ORAL at 08:38

## 2023-12-31 ASSESSMENT — PAIN SCALES - GENERAL
PAINLEVEL_OUTOF10: 4
PAINLEVEL_OUTOF10: 0
PAINLEVEL_OUTOF10: 6

## 2023-12-31 ASSESSMENT — PAIN DESCRIPTION - DESCRIPTORS: DESCRIPTORS: ACHING;DULL

## 2023-12-31 ASSESSMENT — PAIN DESCRIPTION - LOCATION: LOCATION: HEAD

## 2023-12-31 NOTE — PROGRESS NOTES
Hospitalist Progress Note               Daily Progress Note: 12/31/2023      Chief complaint:   Chief Complaint   Patient presents with    Fatigue        Subjective:   Hospital course to date:    73-year-old female with widespread metastatic lung cancer with mets to mediastinum, bone, lung and brain, ESRD, right pleural effusion with Aspira drain in place.  She had a recent pathologic left numerous fracture requiring ORIF.  Patient was recently admitted on 11/28 with worsening shortness of breath.  Chest x-ray showed complete opacification of the left hemithorax.  She underwent placement of an Aspira catheter on the left.  She went back and forth to the ICU several times.  She was treated with Zosyn.  Several discussions were held with daughter regarding hospice but her daughter is holding out hope that patient's condition and performance status will improve enough for her to receive immunotherapy.  Her daughter requested SNF and she was discharged to a facility on 12/22.   She returned yesterday within 24 hours due to lethargy and hypotension.  Blood pressures were as low as 60s systolic in the ED.  I initially spoke with the daughter in the ED, outlined grim prognosis and the fact that her mother was probably actively dying.  She did not want to hear this and demanded transfer to [facility].  Attempts were made to accomplish this but she was declined for transfer.  Patient then was admitted to the ICU and started on pressors.  She did agree to DNR CODE STATUS   Patient was admitted to the ICU, started on norepinephrine.     12/28/23 case management  CM met f/f with Pt daughter, she asked CM to send a referral to [facility] for Pt to be admitted with [facility].   Will send a referral to [facility].    Pt daughter signed the referral for [facility], will place choice letter on Pt chart.    12/31/23 no new complaints,  to the left apex. Nodular opacities in the left lung. The visualized bones and upper abdomen are age-appropriate.     No significant change     XR CHEST (2 VW)    Result Date: 12/20/2023  EXAM: XR CHEST (2 VW) INDICATION: Pleural effusion COMPARISON: 12/17/2023 TECHNIQUE: Frontal and lateral chest views. Lateral views suboptimally exposed. FINDINGS: Right IJ line is again shown terminating in the superior vena cava. Left chest tube is again noted. Internal fixation of the left humerus is again shown. Stent graft in the right axillary region is again noted as are numerous upper extremity clips. Cardiac and mediastinal contours are stable prominent pleural thickening of the left hemithorax is again shown as are numerous pulmonary nodular opacities. No pneumothorax is evident. No acute findings of the right lung and pleural margins is shown.     Stable radiographic appearance.    XR CHEST (2 VW)    Result Date: 12/17/2023  PA AND LATERAL CHEST RADIOGRAPHS: 12/17/2023 1:56 PM INDICATION: Left pleural effusion. COMPARISON: 12/16/2023, 12/11/2023, 12/3/2023, 9/20/2023; CT chest 9/20/2023. TECHNIQUE: Frontal and lateral radiographs of the chest. FINDINGS: A loculated left pleural effusion obscures the left upper lobe mass. The effusion is not significantly changed. Passive atelectasis is associated. The right lung is clear. The central airways are patent. No pneumothorax. The heart is enlarged. A right IJ hemodialysis catheter terminates in appropriate position in the right atrium. Post intramedullary nail and screw fixation of the left femur.     Stable, loculated, left pleural effusion obscures the left upper lobe mass.    XR CHEST PORTABLE    Result Date: 12/16/2023  EXAM:  XR CHEST PORTABLE INDICATION: Respiratory failure COMPARISON: 12/11/2023 TECHNIQUE: 0230 hours portable chest AP view FINDINGS: Right IJ line is again shown terminating in distal superior vena cava. Left chest tube again noted. Mediastinal, hilar and

## 2024-01-01 LAB
ALBUMIN SERPL-MCNC: 2.5 G/DL (ref 3.5–5)
ANION GAP SERPL CALC-SCNC: 8 MMOL/L (ref 5–15)
BASOPHILS # BLD: 0 K/UL (ref 0–0.1)
BASOPHILS NFR BLD: 0 % (ref 0–1)
BUN SERPL-MCNC: 68 MG/DL (ref 6–20)
BUN/CREAT SERPL: 11 (ref 12–20)
CA-I BLD-MCNC: 11.2 MG/DL (ref 8.5–10.1)
CHLORIDE SERPL-SCNC: 102 MMOL/L (ref 97–108)
CO2 SERPL-SCNC: 24 MMOL/L (ref 21–32)
CREAT SERPL-MCNC: 6.14 MG/DL (ref 0.55–1.02)
DIFFERENTIAL METHOD BLD: ABNORMAL
EOSINOPHIL # BLD: 2.4 K/UL (ref 0–0.4)
EOSINOPHIL NFR BLD: 16 % (ref 0–7)
ERYTHROCYTE [DISTWIDTH] IN BLOOD BY AUTOMATED COUNT: 18.3 % (ref 11.5–14.5)
GLUCOSE SERPL-MCNC: 93 MG/DL (ref 65–100)
HCT VFR BLD AUTO: 24.3 % (ref 35–47)
HGB BLD-MCNC: 7.8 G/DL (ref 11.5–16)
IMM GRANULOCYTES # BLD AUTO: 0 K/UL
IMM GRANULOCYTES NFR BLD AUTO: 0 %
LYMPHOCYTES # BLD: 1.2 K/UL (ref 0.8–3.5)
LYMPHOCYTES NFR BLD: 8 % (ref 12–49)
MCH RBC QN AUTO: 29.4 PG (ref 26–34)
MCHC RBC AUTO-ENTMCNC: 32.1 G/DL (ref 30–36.5)
MCV RBC AUTO: 91.7 FL (ref 80–99)
MONOCYTES # BLD: 0.9 K/UL (ref 0–1)
MONOCYTES NFR BLD: 6 % (ref 5–13)
NEUTS SEG # BLD: 10.6 K/UL (ref 1.8–8)
NEUTS SEG NFR BLD: 70 % (ref 32–75)
NRBC # BLD: 0 K/UL (ref 0–0.01)
NRBC BLD-RTO: 0 PER 100 WBC
PHOSPHATE SERPL-MCNC: 3.3 MG/DL (ref 2.6–4.7)
PLATELET # BLD AUTO: 270 K/UL (ref 150–400)
PMV BLD AUTO: 10.3 FL (ref 8.9–12.9)
POTASSIUM SERPL-SCNC: 5.1 MMOL/L (ref 3.5–5.1)
RBC # BLD AUTO: 2.65 M/UL (ref 3.8–5.2)
RBC MORPH BLD: ABNORMAL
SODIUM SERPL-SCNC: 134 MMOL/L (ref 136–145)
WBC # BLD AUTO: 15.1 K/UL (ref 3.6–11)

## 2024-01-01 PROCEDURE — 2000000000 HC ICU R&B

## 2024-01-01 PROCEDURE — 6370000000 HC RX 637 (ALT 250 FOR IP): Performed by: HOSPITALIST

## 2024-01-01 PROCEDURE — 2700000000 HC OXYGEN THERAPY PER DAY

## 2024-01-01 PROCEDURE — 85025 COMPLETE CBC W/AUTO DIFF WBC: CPT

## 2024-01-01 PROCEDURE — 2500000003 HC RX 250 WO HCPCS: Performed by: INTERNAL MEDICINE

## 2024-01-01 PROCEDURE — 2580000003 HC RX 258: Performed by: HOSPITALIST

## 2024-01-01 PROCEDURE — 6370000000 HC RX 637 (ALT 250 FOR IP): Performed by: INTERNAL MEDICINE

## 2024-01-01 PROCEDURE — 94761 N-INVAS EAR/PLS OXIMETRY MLT: CPT

## 2024-01-01 PROCEDURE — 80069 RENAL FUNCTION PANEL: CPT

## 2024-01-01 PROCEDURE — 36415 COLL VENOUS BLD VENIPUNCTURE: CPT

## 2024-01-01 RX ADMIN — MIDODRINE HYDROCHLORIDE 20 MG: 5 TABLET ORAL at 08:33

## 2024-01-01 RX ADMIN — TRAMADOL HYDROCHLORIDE 50 MG: 50 TABLET ORAL at 20:59

## 2024-01-01 RX ADMIN — ACETAMINOPHEN 650 MG: 325 TABLET, FILM COATED ORAL at 03:03

## 2024-01-01 RX ADMIN — SODIUM CHLORIDE, PRESERVATIVE FREE 10 ML: 5 INJECTION INTRAVENOUS at 20:51

## 2024-01-01 RX ADMIN — ACETAMINOPHEN 650 MG: 325 TABLET, FILM COATED ORAL at 20:57

## 2024-01-01 RX ADMIN — ASPIRIN 81 MG: 81 TABLET, COATED ORAL at 08:33

## 2024-01-01 RX ADMIN — FOLIC ACID 1 MG: 1 TABLET ORAL at 08:33

## 2024-01-01 RX ADMIN — PANTOPRAZOLE SODIUM 20 MG: 20 TABLET, DELAYED RELEASE ORAL at 08:33

## 2024-01-01 RX ADMIN — MIDODRINE HYDROCHLORIDE 20 MG: 5 TABLET ORAL at 02:57

## 2024-01-01 RX ADMIN — Medication 11 MCG/MIN: at 08:34

## 2024-01-01 RX ADMIN — MIDODRINE HYDROCHLORIDE 20 MG: 5 TABLET ORAL at 20:51

## 2024-01-01 RX ADMIN — GUAIFENESIN 600 MG: 600 TABLET, EXTENDED RELEASE ORAL at 08:33

## 2024-01-01 RX ADMIN — Medication 5 MG: at 20:57

## 2024-01-01 RX ADMIN — GUAIFENESIN 600 MG: 600 TABLET, EXTENDED RELEASE ORAL at 20:51

## 2024-01-01 RX ADMIN — MIDODRINE HYDROCHLORIDE 20 MG: 5 TABLET ORAL at 14:46

## 2024-01-01 ASSESSMENT — PAIN SCALES - GENERAL
PAINLEVEL_OUTOF10: 5
PAINLEVEL_OUTOF10: 0
PAINLEVEL_OUTOF10: 0

## 2024-01-01 ASSESSMENT — PAIN DESCRIPTION - LOCATION: LOCATION: ARM

## 2024-01-01 NOTE — PROGRESS NOTES
Hospitalist Progress Note               Daily Progress Note: 1/1/2024      Chief complaint:   Chief Complaint   Patient presents with    Fatigue        Subjective:   Hospital course to date:    73-year-old female with widespread metastatic lung cancer with mets to mediastinum, bone, lung and brain, ESRD, right pleural effusion with Aspira drain in place.  She had a recent pathologic left numerous fracture requiring ORIF.  Patient was recently admitted on 11/28 with worsening shortness of breath.  Chest x-ray showed complete opacification of the left hemithorax.  She underwent placement of an Aspira catheter on the left.  She went back and forth to the ICU several times.  She was treated with Zosyn.  Several discussions were held with daughter regarding hospice but her daughter is holding out hope that patient's condition and performance status will improve enough for her to receive immunotherapy.  Her daughter requested SNF and she was discharged to a facility on 12/22.   She returned yesterday within 24 hours due to lethargy and hypotension.  Blood pressures were as low as 60s systolic in the ED.  I initially spoke with the daughter in the ED, outlined grim prognosis and the fact that her mother was probably actively dying.  She did not want to hear this and demanded transfer to [facility].  Attempts were made to accomplish this but she was declined for transfer.  Patient then was admitted to the ICU and started on pressors.  She did agree to DNR CODE STATUS   Patient was admitted to the ICU, started on norepinephrine.     12/28/23 case management  CM met f/f with Pt daughter, she asked CM to send a referral to [facility] for Pt to be admitted with [facility].   Will send a referral to [facility].    Pt daughter signed the referral for [facility], will place choice letter on Pt chart.    12/28/23 hospice  Chart  areas of atelectasis in the left lower lobe. There is interstitial edema in the left lung consistent with reexpansion pulmonary edema. Soft tissue density adjacent to the aortic arch could be adenopathy or loculated pleural fluid. Right lung is clear.     1. Left chest tube is in place. Left pleural effusion has decreased considerably. There is still a loculated component in the left upper lung zone laterally with adjacent nodule. There is some reexpansion edema in the left lung.    IR INSERT TUNNELED PLEURA CATH W CUFF    Result Date: 2023  PATIENT NAME: VICKY BUCIO                                            AGE, : 73 years, 1950 MRN: [id number] DATE: 2023 3:06 PM SUPERVISING PHYSICIAN: Kala Dominique MD OPERATING PROVIDER: Alice Palma PA-C PROCEDURE: ULTRASOUND AND FLUOROSCOPIC GUIDED THORACIC ASPIRA CATHETER PLACEMENT INDICATION: Left malignant pleural effusion SEDATION: Fentanyl 50mcg SEDATION TIME: 20 mins. Moderate intravenous conscious sedation was supervised by Kala Dominique MD. The patient was independently monitored by a registered nurse assigned to the Department of Radiology using automated blood pressure, EKG, and pulse oximetry. The detailed conscious sedation record is stored in the hospital information system. CONSENT:  After full discussion of the procedure, including risks, benefits and alternatives, both verbal and written consent were obtained. TECHNIQUE: A timeout was called to verify the correct patient, procedure, site and allergies. Preliminary ultrasound imaging of the the left hemithorax demonstrated pleural fluid amenable to Aspira catheter placement.  An appropriate site was marked. Images were archived to PACS.  The skin was prepped and draped in sterile fashion.  1% lidocaine was utilized for local anesthesia.  A small dermatotomy was made.  The pleural cavity was accessed under direct sonographic guidance using a 21 gauge micropuncture needle.  A 0.018 inch

## 2024-01-02 LAB
ALBUMIN SERPL-MCNC: 2.5 G/DL (ref 3.5–5)
ANION GAP SERPL CALC-SCNC: 10 MMOL/L (ref 5–15)
BACTERIA SPEC CULT: NORMAL
BASOPHILS # BLD: 0.2 K/UL (ref 0–0.1)
BASOPHILS NFR BLD: 1 % (ref 0–1)
BUN SERPL-MCNC: 71 MG/DL (ref 6–20)
BUN/CREAT SERPL: 11 (ref 12–20)
CA-I BLD-MCNC: 11.3 MG/DL (ref 8.5–10.1)
CHLORIDE SERPL-SCNC: 101 MMOL/L (ref 97–108)
CO2 SERPL-SCNC: 21 MMOL/L (ref 21–32)
CREAT SERPL-MCNC: 6.7 MG/DL (ref 0.55–1.02)
DIFFERENTIAL METHOD BLD: ABNORMAL
EOSINOPHIL # BLD: 1.9 K/UL (ref 0–0.4)
EOSINOPHIL NFR BLD: 11 % (ref 0–7)
ERYTHROCYTE [DISTWIDTH] IN BLOOD BY AUTOMATED COUNT: 18.6 % (ref 11.5–14.5)
GLUCOSE BLD STRIP.AUTO-MCNC: 66 MG/DL (ref 65–100)
GLUCOSE BLD STRIP.AUTO-MCNC: 86 MG/DL (ref 65–100)
GLUCOSE SERPL-MCNC: 72 MG/DL (ref 65–100)
HCT VFR BLD AUTO: 24.3 % (ref 35–47)
HGB BLD-MCNC: 7.7 G/DL (ref 11.5–16)
IMM GRANULOCYTES # BLD AUTO: 0.2 K/UL (ref 0–0.04)
IMM GRANULOCYTES NFR BLD AUTO: 1 % (ref 0–0.5)
LYMPHOCYTES # BLD: 1.1 K/UL (ref 0.8–3.5)
LYMPHOCYTES NFR BLD: 6 % (ref 12–49)
Lab: NORMAL
MCH RBC QN AUTO: 29.2 PG (ref 26–34)
MCHC RBC AUTO-ENTMCNC: 31.7 G/DL (ref 30–36.5)
MCV RBC AUTO: 92 FL (ref 80–99)
MONOCYTES # BLD: 0.7 K/UL (ref 0–1)
MONOCYTES NFR BLD: 4 % (ref 5–13)
NEUTS SEG # BLD: 13.6 K/UL (ref 1.8–8)
NEUTS SEG NFR BLD: 77 % (ref 32–75)
NRBC # BLD: 0 K/UL (ref 0–0.01)
NRBC BLD-RTO: 0 PER 100 WBC
PERFORMED BY:: NORMAL
PERFORMED BY:: NORMAL
PHOSPHATE SERPL-MCNC: 3.9 MG/DL (ref 2.6–4.7)
PLATELET # BLD AUTO: 268 K/UL (ref 150–400)
PMV BLD AUTO: 10 FL (ref 8.9–12.9)
POTASSIUM SERPL-SCNC: 5.4 MMOL/L (ref 3.5–5.1)
RBC # BLD AUTO: 2.64 M/UL (ref 3.8–5.2)
RBC MORPH BLD: ABNORMAL
SODIUM SERPL-SCNC: 132 MMOL/L (ref 136–145)
WBC # BLD AUTO: 17.7 K/UL (ref 3.6–11)

## 2024-01-02 PROCEDURE — 2700000000 HC OXYGEN THERAPY PER DAY

## 2024-01-02 PROCEDURE — 2580000003 HC RX 258: Performed by: INTERNAL MEDICINE

## 2024-01-02 PROCEDURE — 2580000003 HC RX 258: Performed by: HOSPITALIST

## 2024-01-02 PROCEDURE — 6370000000 HC RX 637 (ALT 250 FOR IP): Performed by: HOSPITALIST

## 2024-01-02 PROCEDURE — 6370000000 HC RX 637 (ALT 250 FOR IP): Performed by: INTERNAL MEDICINE

## 2024-01-02 PROCEDURE — 94761 N-INVAS EAR/PLS OXIMETRY MLT: CPT

## 2024-01-02 PROCEDURE — 85025 COMPLETE CBC W/AUTO DIFF WBC: CPT

## 2024-01-02 PROCEDURE — 82962 GLUCOSE BLOOD TEST: CPT

## 2024-01-02 PROCEDURE — 80069 RENAL FUNCTION PANEL: CPT

## 2024-01-02 PROCEDURE — 2000000000 HC ICU R&B

## 2024-01-02 PROCEDURE — 36415 COLL VENOUS BLD VENIPUNCTURE: CPT

## 2024-01-02 RX ORDER — DEXTROSE MONOHYDRATE 100 MG/ML
INJECTION, SOLUTION INTRAVENOUS CONTINUOUS
Status: DISCONTINUED | OUTPATIENT
Start: 2024-01-02 | End: 2024-01-03

## 2024-01-02 RX ORDER — DEXTROSE MONOHYDRATE 100 MG/ML
INJECTION, SOLUTION INTRAVENOUS CONTINUOUS PRN
Status: DISCONTINUED | OUTPATIENT
Start: 2024-01-02 | End: 2024-01-04

## 2024-01-02 RX ADMIN — DEXTROSE MONOHYDRATE: 100 INJECTION, SOLUTION INTRAVENOUS at 08:10

## 2024-01-02 RX ADMIN — PANTOPRAZOLE SODIUM 20 MG: 20 TABLET, DELAYED RELEASE ORAL at 08:00

## 2024-01-02 RX ADMIN — MIDODRINE HYDROCHLORIDE 20 MG: 5 TABLET ORAL at 15:43

## 2024-01-02 RX ADMIN — SODIUM CHLORIDE, PRESERVATIVE FREE 10 ML: 5 INJECTION INTRAVENOUS at 08:00

## 2024-01-02 RX ADMIN — ASPIRIN 81 MG: 81 TABLET, COATED ORAL at 07:59

## 2024-01-02 RX ADMIN — MIDODRINE HYDROCHLORIDE 20 MG: 5 TABLET ORAL at 08:00

## 2024-01-02 RX ADMIN — FOLIC ACID 1 MG: 1 TABLET ORAL at 08:00

## 2024-01-02 RX ADMIN — MIDODRINE HYDROCHLORIDE 20 MG: 5 TABLET ORAL at 03:21

## 2024-01-02 RX ADMIN — DEXTROSE MONOHYDRATE 125 ML: 100 INJECTION, SOLUTION INTRAVENOUS at 08:02

## 2024-01-02 RX ADMIN — GUAIFENESIN 600 MG: 600 TABLET, EXTENDED RELEASE ORAL at 08:00

## 2024-01-02 ASSESSMENT — PAIN SCALES - GENERAL
PAINLEVEL_OUTOF10: 0

## 2024-01-02 NOTE — PROGRESS NOTES
[facility]  Good Help to Those in Need  [phone]     Patient Name: Lizette Lynch  YOB: 1950  Age: 73 y.o.    [facility] RN Note:      Patient reassessed for GIP. Patient awake, spouse at bedside. Able to answer basic questions. Denies pain, shortness of breath. Spouse reports patient drank 2 Ensures but has not eaten today. She remains of Levophed 15 mcg/min with increased confusion noted. Attempted to discuss discontinuance of Levophed and other medications at which time spouse deferred to daughter Sayra. Provided contact information so she could reach this writer.    4:00 PM Met with spouse and daughter in dining room. Shared need to make decision to withdraw care as patient is unable to be discharged home as planned coupled with short prognosis and nothing further to offer patient in terms of treatment. Spouse acknowledged he knows his wife is dying and only wants comfort for her. Explained GIP process in detail. Both are willing to withdraw support tomorrow at 4:00 PM after family is able to say their goodbyes. Rose MOLINA notified.    Slade Padgett RN  Hospice Clinical Liaison  [phone]

## 2024-01-02 NOTE — CARE COORDINATION
13:40PM Hospice consult recv'd and referral sent.  Patient to be evaluated for GIP.  Spoke w/hospice RN (Slade) to inform.       PIA Rothman  [phone]          08:56AM Currently on 4L NC.  Message sent to [facility] administration re: DME issue (mattress).  Awaiting a response.     DDNR form scanned in the chart w/o the patient's or LNOK signature.          PIA Rothman  [phone]

## 2024-01-02 NOTE — PROGRESS NOTES
Patient currently unresponsive requiring 4 L NC O2.    Continues on hospice care and dependent on Levophed 15 mcg/min.    Attempting to wean off Levophed and will transition to inpatient hospice if needed with consultation for inpatient hospice.

## 2024-01-03 PROCEDURE — 6360000002 HC RX W HCPCS: Performed by: FAMILY MEDICINE

## 2024-01-03 PROCEDURE — 6360000002 HC RX W HCPCS: Performed by: INTERNAL MEDICINE

## 2024-01-03 PROCEDURE — 2500000003 HC RX 250 WO HCPCS: Performed by: FAMILY MEDICINE

## 2024-01-03 PROCEDURE — 6370000000 HC RX 637 (ALT 250 FOR IP): Performed by: INTERNAL MEDICINE

## 2024-01-03 PROCEDURE — 2700000000 HC OXYGEN THERAPY PER DAY

## 2024-01-03 PROCEDURE — 87205 SMEAR GRAM STAIN: CPT

## 2024-01-03 PROCEDURE — 87070 CULTURE OTHR SPECIMN AEROBIC: CPT

## 2024-01-03 PROCEDURE — 94761 N-INVAS EAR/PLS OXIMETRY MLT: CPT

## 2024-01-03 PROCEDURE — 2000000000 HC ICU R&B

## 2024-01-03 RX ORDER — MIDAZOLAM HYDROCHLORIDE 2 MG/2ML
1 INJECTION, SOLUTION INTRAMUSCULAR; INTRAVENOUS EVERY 4 HOURS PRN
Status: DISCONTINUED | OUTPATIENT
Start: 2024-01-03 | End: 2024-01-05 | Stop reason: HOSPADM

## 2024-01-03 RX ORDER — MIDAZOLAM HYDROCHLORIDE 2 MG/2ML
4 INJECTION, SOLUTION INTRAMUSCULAR; INTRAVENOUS EVERY 4 HOURS
Status: DISCONTINUED | OUTPATIENT
Start: 2024-01-03 | End: 2024-01-05 | Stop reason: HOSPADM

## 2024-01-03 RX ORDER — HYDROMORPHONE HYDROCHLORIDE 1 MG/ML
0.5 INJECTION, SOLUTION INTRAMUSCULAR; INTRAVENOUS; SUBCUTANEOUS
Status: DISCONTINUED | OUTPATIENT
Start: 2024-01-03 | End: 2024-01-05 | Stop reason: HOSPADM

## 2024-01-03 RX ORDER — GLYCOPYRROLATE 0.2 MG/ML
0.2 INJECTION INTRAMUSCULAR; INTRAVENOUS EVERY 4 HOURS PRN
Status: DISCONTINUED | OUTPATIENT
Start: 2024-01-03 | End: 2024-01-05 | Stop reason: HOSPADM

## 2024-01-03 RX ORDER — HYDROMORPHONE HYDROCHLORIDE 1 MG/ML
0.5 INJECTION, SOLUTION INTRAMUSCULAR; INTRAVENOUS; SUBCUTANEOUS EVERY 4 HOURS
Status: DISCONTINUED | OUTPATIENT
Start: 2024-01-03 | End: 2024-01-05 | Stop reason: HOSPADM

## 2024-01-03 RX ORDER — MIDAZOLAM HYDROCHLORIDE 2 MG/2ML
4 INJECTION, SOLUTION INTRAMUSCULAR; INTRAVENOUS
Status: DISCONTINUED | OUTPATIENT
Start: 2024-01-03 | End: 2024-01-05 | Stop reason: HOSPADM

## 2024-01-03 RX ORDER — HYDROMORPHONE HYDROCHLORIDE 1 MG/ML
1 INJECTION, SOLUTION INTRAMUSCULAR; INTRAVENOUS; SUBCUTANEOUS EVERY 4 HOURS PRN
Status: DISCONTINUED | OUTPATIENT
Start: 2024-01-03 | End: 2024-01-05 | Stop reason: HOSPADM

## 2024-01-03 RX ADMIN — HYDROXYZINE PAMOATE 50 MG: 25 CAPSULE ORAL at 02:00

## 2024-01-03 RX ADMIN — TRAMADOL HYDROCHLORIDE 50 MG: 50 TABLET ORAL at 02:00

## 2024-01-03 RX ADMIN — MIDAZOLAM HYDROCHLORIDE 1 MG: 1 INJECTION, SOLUTION INTRAMUSCULAR; INTRAVENOUS at 16:30

## 2024-01-03 RX ADMIN — Medication 5 MG: at 02:00

## 2024-01-03 RX ADMIN — MIDAZOLAM HYDROCHLORIDE 4 MG: 1 INJECTION, SOLUTION INTRAMUSCULAR; INTRAVENOUS at 22:00

## 2024-01-03 RX ADMIN — HYDROMORPHONE HYDROCHLORIDE 0.5 MG: 1 INJECTION, SOLUTION INTRAMUSCULAR; INTRAVENOUS; SUBCUTANEOUS at 22:00

## 2024-01-03 RX ADMIN — MIDAZOLAM HYDROCHLORIDE 1 MG: 1 INJECTION, SOLUTION INTRAMUSCULAR; INTRAVENOUS at 09:55

## 2024-01-03 RX ADMIN — MIDODRINE HYDROCHLORIDE 20 MG: 5 TABLET ORAL at 02:00

## 2024-01-03 RX ADMIN — HYDROMORPHONE HYDROCHLORIDE 1 MG: 1 INJECTION, SOLUTION INTRAMUSCULAR; INTRAVENOUS; SUBCUTANEOUS at 17:05

## 2024-01-03 ASSESSMENT — PAIN SCALES - GENERAL
PAINLEVEL_OUTOF10: 6
PAINLEVEL_OUTOF10: 0
PAINLEVEL_OUTOF10: 6
PAINLEVEL_OUTOF10: 6

## 2024-01-03 NOTE — PROGRESS NOTES
responded to request to support pt/family for pt being placed on comfort measures. Pt sleeps intermittently. Family is emotional and weeps throughout the visit. Family engaged in life review/legacy/joyful storytelling. Pt son exits the room expressing strong emotion through body gestures. Family shares that pt is kind, loving, and caring and full of life. Family is receptive to the prayer and shares their gratitude for the support.     listened attentively as family shared feelings. Explored/affirmed their feelings, facilitated life review/storytelling. Provided prayer, a supportive and peaceful presence. Offered comforting words and encouragement. Advised family of  availability.    Rev. Mariana Ricks MDIV   can be reached by calling the  at [facility]  [phone]

## 2024-01-03 NOTE — PLAN OF CARE
Problem: Neurosensory - Adult  Goal: Achieves stable or improved neurological status  Outcome: Not Progressing  Goal: Achieves maximal functionality and self care  Outcome: Not Progressing     Problem: Musculoskeletal - Adult  Goal: Return mobility to safest level of function  Outcome: Not Progressing  Goal: Return ADL status to a safe level of function  Outcome: Not Progressing     Problem: Gastrointestinal - Adult  Goal: Maintains adequate nutritional intake  Outcome: Not Progressing     Problem: Neurosensory - Adult  Goal: Achieves stable or improved neurological status  Outcome: Not Progressing  Goal: Achieves maximal functionality and self care  Outcome: Not Progressing     Problem: Musculoskeletal - Adult  Goal: Return mobility to safest level of function  Outcome: Not Progressing  Goal: Return ADL status to a safe level of function  Outcome: Not Progressing     Problem: Gastrointestinal - Adult  Goal: Maintains adequate nutritional intake  Outcome: Not Progressing

## 2024-01-03 NOTE — PROGRESS NOTES
[facility]  Good Help to Those in Need  [phone]     Patient Name: Lizette Lynch  YOB: 1950  Age: 73 y.o.    [facility] RN Note:      Multiple visits and conversations with family at bedside. Daughter Sayra declined GIP admission until tomorrow. She is aware of patient's short prognosis and does want to proceed with CMO. Orders placed for Dilaudid 0.5 mg IV Q4H & Q 15 min prn and Versed 4 mg IV Q4H and Q 15min prn. [facility] will admit GIP should patient survive tonight.    Slade Padgett RN  Hospice Clinical Liaison  [phone]

## 2024-01-03 NOTE — PROGRESS NOTES
Family and Hospice nurse at the bedside at this time. Requesting that I administer versed and go ahead with discontinuing the patients current IV medications of d10 and levo.

## 2024-01-03 NOTE — PROGRESS NOTES
Patient very agitated for the start of my shift. She refused her blood sugar check as well as all PO medications and food/drink. Continues to pull off telemetry leads and wires as well as her mitts. She is stating over and over that she doesn't want to be here at the hospital and would not want any other person to be going through this \"torture.\" I spoke with the primary MD and they were able to switch her anxiety medication to IV vs. PO. I will notify the family of this and we will continue to monitor.    Current plan is to await hospice transfer.

## 2024-01-04 LAB
GLUCOSE BLD STRIP.AUTO-MCNC: 20 MG/DL (ref 65–100)
GLUCOSE BLD STRIP.AUTO-MCNC: 20 MG/DL (ref 65–100)
PERFORMED BY:: ABNORMAL
PERFORMED BY:: ABNORMAL

## 2024-01-04 PROCEDURE — 36556 INSERT NON-TUNNEL CV CATH: CPT

## 2024-01-04 PROCEDURE — 2500000003 HC RX 250 WO HCPCS: Performed by: FAMILY MEDICINE

## 2024-01-04 PROCEDURE — 6360000002 HC RX W HCPCS: Performed by: FAMILY MEDICINE

## 2024-01-04 PROCEDURE — 82962 GLUCOSE BLOOD TEST: CPT

## 2024-01-04 PROCEDURE — 1100000000 HC RM PRIVATE

## 2024-01-04 RX ADMIN — MIDAZOLAM HYDROCHLORIDE 4 MG: 1 INJECTION, SOLUTION INTRAMUSCULAR; INTRAVENOUS at 06:23

## 2024-01-04 RX ADMIN — MIDAZOLAM HYDROCHLORIDE 4 MG: 1 INJECTION, SOLUTION INTRAMUSCULAR; INTRAVENOUS at 22:54

## 2024-01-04 RX ADMIN — HYDROMORPHONE HYDROCHLORIDE 0.5 MG: 1 INJECTION, SOLUTION INTRAMUSCULAR; INTRAVENOUS; SUBCUTANEOUS at 06:23

## 2024-01-04 RX ADMIN — MIDAZOLAM HYDROCHLORIDE 4 MG: 1 INJECTION, SOLUTION INTRAMUSCULAR; INTRAVENOUS at 14:34

## 2024-01-04 RX ADMIN — HYDROMORPHONE HYDROCHLORIDE 0.5 MG: 1 INJECTION, SOLUTION INTRAMUSCULAR; INTRAVENOUS; SUBCUTANEOUS at 22:54

## 2024-01-04 RX ADMIN — HYDROMORPHONE HYDROCHLORIDE 0.5 MG: 1 INJECTION, SOLUTION INTRAMUSCULAR; INTRAVENOUS; SUBCUTANEOUS at 02:18

## 2024-01-04 RX ADMIN — HYDROMORPHONE HYDROCHLORIDE 0.5 MG: 1 INJECTION, SOLUTION INTRAMUSCULAR; INTRAVENOUS; SUBCUTANEOUS at 14:34

## 2024-01-04 RX ADMIN — HYDROMORPHONE HYDROCHLORIDE 0.5 MG: 1 INJECTION, SOLUTION INTRAMUSCULAR; INTRAVENOUS; SUBCUTANEOUS at 10:33

## 2024-01-04 RX ADMIN — HYDROMORPHONE HYDROCHLORIDE 0.5 MG: 1 INJECTION, SOLUTION INTRAMUSCULAR; INTRAVENOUS; SUBCUTANEOUS at 18:09

## 2024-01-04 RX ADMIN — MIDAZOLAM HYDROCHLORIDE 4 MG: 1 INJECTION, SOLUTION INTRAMUSCULAR; INTRAVENOUS at 10:33

## 2024-01-04 RX ADMIN — MIDAZOLAM HYDROCHLORIDE 4 MG: 1 INJECTION, SOLUTION INTRAMUSCULAR; INTRAVENOUS at 02:18

## 2024-01-04 RX ADMIN — MIDAZOLAM HYDROCHLORIDE 4 MG: 1 INJECTION, SOLUTION INTRAMUSCULAR; INTRAVENOUS at 18:10

## 2024-01-04 ASSESSMENT — PAIN SCALES - GENERAL
PAINLEVEL_OUTOF10: 0

## 2024-01-04 NOTE — PROGRESS NOTES
4 Eyes Skin Assessment     NAME:  Lizette Lynch  YOB: 1950  MEDICAL RECORD NUMBER:  [id number]    The patient is being assessed for  Transfer to New Unit    I agree that at least one RN has performed a thorough Head to Toe Skin Assessment on the patient. ALL assessment sites listed below have been assessed.      Areas assessed by both nurses:    Head, Face, Ears, Shoulders, Back, Chest, Arms, Elbows, Hands, Sacrum. Buttock, Coccyx, Ischium, and Legs. Feet and Heels        Does the Patient have a Wound? Yes wound(s) were present on assessment. LDA wound assessment was Initiated and completed by RN       Dhaval Prevention initiated by RN: Yes  Wound Care Orders initiated by RN: No    Pressure Injury (Stage 3,4, Unstageable, DTI, NWPT, and Complex wounds) if present, place Wound referral order by RN under : Yes    New Ostomies, if present place, Ostomy referral order under : No     Nurse 1 eSignature: Electronically signed by Whitley Jaramillo RN on 1/4/24 at 7:53 AM EST    **SHARE this note so that the co-signing nurse can place an eSignature**    Nurse 2 eSignature: {Esignature:[id number]}

## 2024-01-04 NOTE — PROGRESS NOTES
[facility]  Good Help to Those in Need  [phone]    Patient Name: Lizette Lynch  YOB: 1950  Age: 73 y.o.    [facility] RN Note:  Hospice consult noted. Chart reviewed. Plan of care discussed with patients nurse & care manager.     In to see patient, no family at bedside. Called and spoke with daughter. She will be here about 130. Still waiting on daughter to arrive. We will review our services then.    and son at bedside. He said his daughter is to sign consents.   has left.   Will reeval tomorrow.    Thank you for the opportunity to be of service to this patient.   Shanel Sullivan RN  [phone]

## 2024-01-04 NOTE — PROGRESS NOTES
Patient quite somnolent today and somewhat nonsensical speech.  Weaned off of Levophed gtt. and currently related to low blood pressures.    Continue on supplemental oxygen for comfort and comfort measures.    Inpatient hospice following.

## 2024-01-04 NOTE — CARE COORDINATION
0805: Chart reviewed.    [facility] nurse following patient.    CM will continue to follow patient and recs of medical team.

## 2024-01-04 NOTE — PLAN OF CARE
Problem: Respiratory - Adult  Goal: Achieves optimal ventilation and oxygenation  Outcome: Progressing  Flowsheets (Taken 1/4/2024 0100)  Achieves optimal ventilation and oxygenation:   Assess for changes in respiratory status   Assess for changes in mentation and behavior   Position to facilitate oxygenation and minimize respiratory effort   Oxygen supplementation based on oxygen saturation or arterial blood gases   Initiate smoking cessation protocol as indicated   Assess the need for suctioning and aspirate as needed   Respiratory therapy support as indicated     Problem: Skin/Tissue Integrity - Adult  Goal: Skin integrity remains intact  Outcome: Progressing  Flowsheets (Taken 1/4/2024 0100)  Skin Integrity Remains Intact: Monitor for areas of redness and/or skin breakdown     Problem: Musculoskeletal - Adult  Goal: Return mobility to safest level of function  Outcome: Progressing  Flowsheets (Taken 1/4/2024 0100)  Return Mobility to Safest Level of Function: Apply continuous passive motion per provider or physical therapy orders to increase flexion toward goal  Goal: Return ADL status to a safe level of function  Outcome: Progressing  Flowsheets (Taken 1/4/2024 0100)  Return ADL Status to a Safe Level of Function: Administer medication as ordered     Problem: Metabolic/Fluid and Electrolytes - Adult  Goal: Hemodynamic stability and optimal renal function maintained  Outcome: Progressing  Flowsheets (Taken 1/4/2024 0100)  Hemodynamic stability and optimal renal function maintained:   Monitor intake, output and patient weight   Monitor response to interventions for patient's volume status, including labs, urine output, blood pressure (other measures as available)     Problem: Skin/Tissue Integrity  Goal: Absence of new skin breakdown  Description: 1.  Monitor for areas of redness and/or skin breakdown  2.  Assess vascular access sites hourly  3.  Every 4-6 hours minimum:  Change oxygen saturation probe site  4.   Every 4-6 hours:  If on nasal continuous positive airway pressure, respiratory therapy assess nares and determine need for appliance change or resting period.  Outcome: Progressing     Problem: ABCDS Injury Assessment  Goal: Absence of physical injury  Outcome: Progressing  Flowsheets (Taken 1/4/2024 0100)  Absence of Physical Injury: Implement safety measures based on patient assessment     Problem: Cardiovascular - Adult  Goal: Maintains optimal cardiac output and hemodynamic stability  Outcome: Progressing  Flowsheets  Taken 1/4/2024 0509  Maintains optimal cardiac output and hemodynamic stability: For PPHN infants, administer sedation as ordered and minimize all controllable stressors.  Taken 1/4/2024 0100  Maintains optimal cardiac output and hemodynamic stability:   Monitor blood pressure and heart rate   Monitor urine output and notify Licensed Independent Practitioner for values outside of normal range   Assess for signs of decreased cardiac output  Goal: Absence of cardiac dysrhythmias or at baseline  Outcome: Progressing  Flowsheets (Taken 1/4/2024 0100)  Absence of cardiac dysrhythmias or at baseline:   Monitor cardiac rate and rhythm   Assess for signs of decreased cardiac output     Problem: Gastrointestinal - Adult  Goal: Minimal or absence of nausea and vomiting  Outcome: Progressing  Flowsheets (Taken 1/4/2024 0100)  Minimal or absence of nausea and vomiting:   Administer ordered antiemetic medications as needed   Provide nonpharmacologic comfort measures as appropriate  Goal: Maintains or returns to baseline bowel function  Outcome: Progressing  Flowsheets (Taken 1/4/2024 0100)  Maintains or returns to baseline bowel function:   Assess bowel function   Administer ordered medications as needed  Goal: Maintains adequate nutritional intake  Outcome: Progressing  Flowsheets (Taken 1/4/2024 0100)  Maintains adequate nutritional intake: Monitor intake and output, weight and lab values  Goal: Establish

## 2024-01-05 VITALS
BODY MASS INDEX: 25.67 KG/M2 | WEIGHT: 130.73 LBS | RESPIRATION RATE: 24 BRPM | TEMPERATURE: 97.5 F | HEIGHT: 60 IN | SYSTOLIC BLOOD PRESSURE: 78 MMHG | OXYGEN SATURATION: 76 % | HEART RATE: 61 BPM | DIASTOLIC BLOOD PRESSURE: 22 MMHG

## 2024-01-05 LAB
BACTERIA SPEC CULT: NORMAL
Lab: NORMAL

## 2024-01-05 PROCEDURE — 6360000002 HC RX W HCPCS: Performed by: FAMILY MEDICINE

## 2024-01-05 PROCEDURE — 2500000003 HC RX 250 WO HCPCS: Performed by: FAMILY MEDICINE

## 2024-01-05 RX ADMIN — MIDAZOLAM HYDROCHLORIDE 4 MG: 1 INJECTION, SOLUTION INTRAMUSCULAR; INTRAVENOUS at 02:04

## 2024-01-05 RX ADMIN — HYDROMORPHONE HYDROCHLORIDE 0.5 MG: 1 INJECTION, SOLUTION INTRAMUSCULAR; INTRAVENOUS; SUBCUTANEOUS at 02:02

## 2024-01-05 NOTE — PROGRESS NOTES
2135H: BP= 71/26mmHg, RR=39 brpm, O2 Saturation of 71%, POC Glucose=<20mg/dL.  Patient on DNR. Writer called rapid response. Rapid team came in and confirmed that the patient is on comfort care measures. Comfort care ordered by Dr. Mora. Writer updated the daughter Sayra Wakefield through phone updating her of the patients status and confirmed the patients wishes not to do life sustaining measures. Writer advised her to see the patient as she is showing signs of deterioration and probability of death based on her vital signs. Patient made comfortable as much as possible.     0318H: No Pulse, Not breathing. No sign of life confirmed by carlo and JESSE Lorenzo.      0320H: Writter informed daughter that the patient had passed away and agreed to hold the patient until she arrives.    0322H: Nurse Supervisor Leslie, informed of patients death.    0325H:  requested to see the patient. RevCaden Gibson informed and on his way.     0331H: Dr. Maza informed through secured message.    0340H: Daughter Sayra at bedside.     0357H: Hospice staff Sophia notified of patients death and will notify the team.     0400H:  RevCaden Gibson came to see the patient and Family. Offered comforting words and listened attentively as daughter shared feelings of grief.     0414H: Jose Manuel Allen, [employer] coordinator notified for possible organ donation. Eye bank notified as well.      0600H: Daughter said they are still waiting for family members to arrive.    0630H: Post mortem care done. Patient brought to [facility].

## 2024-01-05 NOTE — PROGRESS NOTES
Spiritual Care Assessment/Progress Note  [facility]    Name: Lizette Lynch MRN: [id number]    Age: 73 y.o.     Sex: female   Language: English     Date: 2024            Total Time Calculated: 41 min              Spiritual Assessment begun in [facility] [place]  Service Provided For:: Family  Referral/Consult From:: Nurse  Encounter Overview/Reason : Crisis    Spiritual beliefs:      [] Involved in a veronica tradition/spiritual practice:      [] Supported by a veronica community:      [] Claims no spiritual orientation:      [] Seeking spiritual identity:           [x] Adheres to an individual form of spirituality:      [] Not able to assess:                Identified resources for coping and support system:   Support System: Family members       [] Prayer                  [] Devotional reading               [] Music                  [] Guided Imagery     [] Pet visits                                        [] Other: (COMMENT)     Specific area/focus of visit   Encounter:    Crisis: Type: Family Care  Spiritual/Emotional needs: Type: Emotional Distress  Ritual, Rites and Sacraments:    Grief, Loss, and Adjustments: Type: Death  Ethics/Mediation:    Behavioral Health:    Palliative Care:    Advance Care Planning:      Plan/Referrals: Other (Comment) ( is available if needed)    Narrative:  Visited patient in 215 for family care of  patient per nurse's request.  Patient and daughter were present during the visit.  Patient shared about her sweet and sacrificial mother who extremely cared for many.  Stated she is from [place] and put life on pause to be with her and seemed to process her emotions verbally and tearfully.  Stated she is spiritual and believes in God but is not Jain.  Provided supportive presence while exploring her needs and resources as well as affirming her emotions and relational bond.  Offered and provided prayer per request and dvised of  availability.

## 2024-01-05 NOTE — DISCHARGE SUMMARY
HOSPITALIST DEATH NOTE  Jaspal Rodríguez MD, [facility]   [place]       PATIENT ID: Lizette Lynch  MRN: [id number]   YOB: 1950    DATE OF ADMISSION: 12/23/2023  3:06 PM    DATE OF DISCHARGE: 01/05/24    PRIMARY CARE PROVIDER: Keerthi Metzger MD     ATTENDING PHYSICIAN: JASPAL RODRÍGUEZ MD  DISCHARGING PROVIDER: JASPAL RODRÍGUEZ MD        CONSULTATIONS: IP CONSULT TO HOSPICE  IP CONSULT TO HOSPICE  IP CONSULT TO HOSPICE    PROCEDURES/SURGERIES: * No surgery found *    ADMITTING DIAGNOSES & HOSPITAL COURSE:   Hospital course to date:     73-year-old female with widespread metastatic lung cancer with mets to mediastinum, bone, lung and brain, ESRD, right pleural effusion with Aspira drain in place.  She had a recent pathologic left numerous fracture requiring ORIF.  Patient was recently admitted on 11/28 with worsening shortness of breath.  Chest x-ray showed complete opacification of the left hemithorax.  She underwent placement of an Aspira catheter on the left.  She went back and forth to the ICU several times.  She was treated with Zosyn.  Several discussions were held with daughter regarding hospice but her daughter is holding out hope that patient's condition and performance status will improve enough for her to receive immunotherapy.  Her daughter requested SNF and she was discharged to a facility on 12/22.   She returned yesterday within 24 hours due to lethargy and hypotension.  Blood pressures were as low as 60s systolic in the ED.  I initially spoke with the daughter in the ED, outlined grim prognosis and the fact that her mother was probably actively dying.  She did not want to hear this and demanded transfer to [facility].  Attempts were made to accomplish this but she was declined for transfer.  Patient then was admitted to the ICU and started on pressors.  She did agree to DNR CODE STATUS   Patient was admitted to  MCV 92.0 80.0 - 99.0 FL    MCH 29.2 26.0 - 34.0 PG    MCHC 31.7 30.0 - 36.5 g/dL    RDW 18.6 (H) 11.5 - 14.5 %    Platelets 268 150 - 400 K/uL    MPV 10.0 8.9 - 12.9 FL    Nucleated RBCs 0.0 0.0  WBC    nRBC 0.00 0.00 - 0.01 K/uL    Neutrophils % 77 (H) 32 - 75 %    Lymphocytes % 6 (L) 12 - 49 %    Monocytes % 4 (L) 5 - 13 %    Eosinophils % 11 (H) 0 - 7 %    Basophils % 1 0 - 1 %    Immature Granulocytes 1 (H) 0 - 0.5 %    Neutrophils Absolute 13.6 (H) 1.8 - 8.0 K/UL    Lymphocytes Absolute 1.1 0.8 - 3.5 K/UL    Monocytes Absolute 0.7 0.0 - 1.0 K/UL    Eosinophils Absolute 1.9 (H) 0.0 - 0.4 K/UL    Basophils Absolute 0.2 (H) 0.0 - 0.1 K/UL    Absolute Immature Granulocyte 0.2 (H) 0.00 - 0.04 K/UL    Differential Type Smear Scanned      RBC Comment Polychromasia  2+       POCT Glucose    Collection Time: 01/02/24  7:21 AM   Result Value Ref Range    POC Glucose 66 65 - 100 mg/dL    Performed by: CECE GRAYSON    POCT Glucose    Collection Time: 01/02/24  8:41 AM   Result Value Ref Range    POC Glucose 86 65 - 100 mg/dL    Performed by: Rui Garcia    Culture, Wound    Collection Time: 01/03/24  9:53 AM    Specimen: Swab   Result Value Ref Range    Special Requests No Special Requests      Culture No Acinetobacter isolated SO FAR     POCT Glucose    Collection Time: 01/04/24  9:36 PM   Result Value Ref Range    POC Glucose 20 (LL) 65 - 100 mg/dL    Performed by: DEL LOS REYES Beverly    POCT Glucose    Collection Time: 01/04/24  9:37 PM   Result Value Ref Range    POC Glucose 20 (LL) 65 - 100 mg/dL    Performed by: DEL LOS REYES Beverly         Greater than 25 minutes were spent with the patient on counseling and coordination of care    Signed:   GRETA ASENCIO MD  1/5/2024  8:19 AM